# Patient Record
Sex: FEMALE | Race: WHITE | HISPANIC OR LATINO | Employment: UNEMPLOYED | URBAN - METROPOLITAN AREA
[De-identification: names, ages, dates, MRNs, and addresses within clinical notes are randomized per-mention and may not be internally consistent; named-entity substitution may affect disease eponyms.]

---

## 2019-08-12 ENCOUNTER — HOSPITAL ENCOUNTER (EMERGENCY)
Facility: OTHER | Age: 24
Discharge: HOME | End: 2019-08-12
Attending: EMERGENCY MEDICINE
Payer: MEDICAID

## 2019-08-12 VITALS
HEIGHT: 65 IN | BODY MASS INDEX: 22.49 KG/M2 | TEMPERATURE: 98 F | WEIGHT: 135 LBS | OXYGEN SATURATION: 100 % | DIASTOLIC BLOOD PRESSURE: 61 MMHG | HEART RATE: 84 BPM | SYSTOLIC BLOOD PRESSURE: 112 MMHG | RESPIRATION RATE: 16 BRPM

## 2019-08-12 DIAGNOSIS — O20.0 THREATENED MISCARRIAGE: Primary | ICD-10-CM

## 2019-08-12 DIAGNOSIS — O99.891 BACTERIURIA DURING PREGNANCY: ICD-10-CM

## 2019-08-12 DIAGNOSIS — R82.71 BACTERIURIA DURING PREGNANCY: ICD-10-CM

## 2019-08-12 LAB
ABO + RH BLD: NORMAL
ALBUMIN SERPL BCP-MCNC: 3.8 G/DL (ref 3.5–5.2)
ALP SERPL-CCNC: 68 U/L (ref 55–135)
ALT SERPL W/O P-5'-P-CCNC: 13 U/L (ref 10–44)
ANION GAP SERPL CALC-SCNC: 10 MMOL/L (ref 8–16)
APTT BLDCRRT: 28.7 SEC (ref 21–32)
AST SERPL-CCNC: 13 U/L (ref 10–40)
B-HCG UR QL: POSITIVE
BACTERIA #/AREA URNS HPF: ABNORMAL /HPF
BASOPHILS # BLD AUTO: 0.04 K/UL (ref 0–0.2)
BASOPHILS NFR BLD: 0.4 % (ref 0–1.9)
BILIRUB SERPL-MCNC: 0.2 MG/DL (ref 0.1–1)
BILIRUB UR QL STRIP: NEGATIVE
BUN SERPL-MCNC: 10 MG/DL (ref 6–20)
C TRACH DNA SPEC QL NAA+PROBE: NOT DETECTED
CALCIUM SERPL-MCNC: 9 MG/DL (ref 8.7–10.5)
CHLORIDE SERPL-SCNC: 105 MMOL/L (ref 95–110)
CLARITY UR: CLEAR
CO2 SERPL-SCNC: 24 MMOL/L (ref 23–29)
COLOR UR: YELLOW
CREAT SERPL-MCNC: 0.7 MG/DL (ref 0.5–1.4)
CTP QC/QA: YES
DIFFERENTIAL METHOD: ABNORMAL
EOSINOPHIL # BLD AUTO: 0.2 K/UL (ref 0–0.5)
EOSINOPHIL NFR BLD: 1.5 % (ref 0–8)
ERYTHROCYTE [DISTWIDTH] IN BLOOD BY AUTOMATED COUNT: 13.1 % (ref 11.5–14.5)
EST. GFR  (AFRICAN AMERICAN): >60 ML/MIN/1.73 M^2
EST. GFR  (NON AFRICAN AMERICAN): >60 ML/MIN/1.73 M^2
GLUCOSE SERPL-MCNC: 95 MG/DL (ref 70–110)
GLUCOSE UR QL STRIP: NEGATIVE
HCG INTACT+B SERPL-ACNC: NORMAL MIU/ML
HCT VFR BLD AUTO: 36.6 % (ref 37–48.5)
HGB BLD-MCNC: 11.9 G/DL (ref 12–16)
HGB UR QL STRIP: ABNORMAL
IMM GRANULOCYTES # BLD AUTO: 0.08 K/UL (ref 0–0.04)
IMM GRANULOCYTES NFR BLD AUTO: 0.7 % (ref 0–0.5)
INR PPP: 0.9 (ref 0.8–1.2)
KETONES UR QL STRIP: NEGATIVE
LEUKOCYTE ESTERASE UR QL STRIP: NEGATIVE
LYMPHOCYTES # BLD AUTO: 3.2 K/UL (ref 1–4.8)
LYMPHOCYTES NFR BLD: 28.3 % (ref 18–48)
MCH RBC QN AUTO: 28.7 PG (ref 27–31)
MCHC RBC AUTO-ENTMCNC: 32.5 G/DL (ref 32–36)
MCV RBC AUTO: 88 FL (ref 82–98)
MICROSCOPIC COMMENT: ABNORMAL
MONOCYTES # BLD AUTO: 0.8 K/UL (ref 0.3–1)
MONOCYTES NFR BLD: 7 % (ref 4–15)
N GONORRHOEA DNA SPEC QL NAA+PROBE: NOT DETECTED
NEUTROPHILS # BLD AUTO: 7 K/UL (ref 1.8–7.7)
NEUTROPHILS NFR BLD: 62.1 % (ref 38–73)
NITRITE UR QL STRIP: NEGATIVE
NRBC BLD-RTO: 0 /100 WBC
PH UR STRIP: 6 [PH] (ref 5–8)
PLATELET # BLD AUTO: 246 K/UL (ref 150–350)
PMV BLD AUTO: 10.5 FL (ref 9.2–12.9)
POTASSIUM SERPL-SCNC: 3.3 MMOL/L (ref 3.5–5.1)
PROT SERPL-MCNC: 6.9 G/DL (ref 6–8.4)
PROT UR QL STRIP: NEGATIVE
PROTHROMBIN TIME: 10.1 SEC (ref 9–12.5)
RBC # BLD AUTO: 4.14 M/UL (ref 4–5.4)
RBC #/AREA URNS HPF: 9 /HPF (ref 0–4)
SODIUM SERPL-SCNC: 139 MMOL/L (ref 136–145)
SP GR UR STRIP: <=1.005 (ref 1–1.03)
SQUAMOUS #/AREA URNS HPF: 11 /HPF
URN SPEC COLLECT METH UR: ABNORMAL
UROBILINOGEN UR STRIP-ACNC: NEGATIVE EU/DL
WBC # BLD AUTO: 11.32 K/UL (ref 3.9–12.7)
WBC #/AREA URNS HPF: 4 /HPF (ref 0–5)

## 2019-08-12 PROCEDURE — 87491 CHLMYD TRACH DNA AMP PROBE: CPT

## 2019-08-12 PROCEDURE — 85730 THROMBOPLASTIN TIME PARTIAL: CPT

## 2019-08-12 PROCEDURE — 85025 COMPLETE CBC W/AUTO DIFF WBC: CPT

## 2019-08-12 PROCEDURE — 81000 URINALYSIS NONAUTO W/SCOPE: CPT

## 2019-08-12 PROCEDURE — 84702 CHORIONIC GONADOTROPIN TEST: CPT

## 2019-08-12 PROCEDURE — 81025 URINE PREGNANCY TEST: CPT | Performed by: EMERGENCY MEDICINE

## 2019-08-12 PROCEDURE — 80053 COMPREHEN METABOLIC PANEL: CPT

## 2019-08-12 PROCEDURE — 99284 EMERGENCY DEPT VISIT MOD MDM: CPT | Mod: 25

## 2019-08-12 PROCEDURE — 86901 BLOOD TYPING SEROLOGIC RH(D): CPT

## 2019-08-12 PROCEDURE — 85610 PROTHROMBIN TIME: CPT

## 2019-08-12 RX ORDER — PYRIDOXINE HCL (VITAMIN B6) 25 MG
25 TABLET ORAL EVERY 6 HOURS PRN
Qty: 30 TABLET | Refills: 0 | Status: SHIPPED | OUTPATIENT
Start: 2019-08-12

## 2019-08-12 RX ORDER — ACETAMINOPHEN 325 MG/1
650 TABLET ORAL EVERY 6 HOURS PRN
Qty: 30 TABLET | Refills: 0 | Status: SHIPPED | OUTPATIENT
Start: 2019-08-12

## 2019-08-12 RX ORDER — NITROFURANTOIN 25; 75 MG/1; MG/1
100 CAPSULE ORAL 2 TIMES DAILY
Qty: 10 CAPSULE | Refills: 0 | Status: SHIPPED | OUTPATIENT
Start: 2019-08-12 | End: 2019-08-17

## 2019-08-12 NOTE — ED NOTES
Pt AAOx4, resting comfortably in bed, NAD, respirations E/UL, updated on POC, wheels locked and in low position, call bell with in reach, Comfort positioning and restroom needs were addressed. Necessary items were placed with in reach and was advised when a reassessment would take place. Will continue to monitor

## 2019-08-12 NOTE — ED NOTES
Pt AAOx4, resting comfortably in bed with spouse at bedside, NAD, respirations E/UL, updated on POC, wheels locked and in low position, call bell with in reach, Comfort positioning and restroom needs were addressed. Necessary items were placed with in reach and was advised when a reassessment would take place. Will continue to monitor

## 2019-08-12 NOTE — ED PROVIDER NOTES
Encounter Date: 8/12/2019    SCRIBE #1 NOTE: I, Emma Gary, am scribing for, and in the presence of, Dr. Stevens.       History     Chief Complaint   Patient presents with    Vaginal Bleeding     reports 7 weeks gestation with vaginal bleeding that began aprox 30 min PTA.      Time seen by provider: 1:17 AM    Patient presents with complaint of vaginal bleeding that began twenty minutes PTA. The patient reports she is 7 weeks pregnant and this is her first pregnancy. She reports the vaginal bleeding is heavier than her normal menstrual periods. She also reports minor abdominal pain, and minor associated dyspnea. She reports nausea which has been normal since the beginning of her pregnancy. The patient reports she has seen a OB/GYN for her pregnancy but has not yet had an ultrasound. The patient denies dysuria, burning sensations when urinating, chest pain, lightheadedness, or vomiting. She denies major medical problems.    The history is provided by the patient and medical records.     Review of patient's allergies indicates:  No Known Allergies  History reviewed. No pertinent past medical history.  History reviewed. No pertinent surgical history.  Family History   Problem Relation Age of Onset    Breast cancer Paternal Grandmother     Breast cancer Paternal Aunt     Breast cancer Paternal Aunt     Ovarian cancer Neg Hx     Colon cancer Neg Hx      Social History     Tobacco Use    Smoking status: Never Smoker    Smokeless tobacco: Never Used   Substance Use Topics    Alcohol use: Never     Frequency: Never    Drug use: Never     Review of Systems   All other systems reviewed and are negative.    ROS: As per HPI and below:   General: No fever.   HENT: No facial pain.   Eyes: Negative for eye pain.   Cardiovascular: No chest pain.   Respiratory:  Minor shortness of breath. No dyspnea.   GI: Abdominal pain. Nausea. No vomiting. No diarrhea.   : Vaginal bleeding. No dysuria. No burning sensations when  "urinating.  Skin: No rashes.   Neuro:  No lightheadedness. No syncope.  No focal deficits.   Musculoskeletal: No extremity pain.  All other systems reviewed and are negative.    Physical Exam     Initial Vitals [08/12/19 0108]   BP Pulse Resp Temp SpO2   121/66 85 18 98.4 °F (36.9 °C) 100 %      MAP       --         Physical Exam    Nursing note and vitals reviewed.    Nursing note and vitals reviewed.  /61   Pulse 84   Temp 98.3 °F (36.8 °C) (Oral)   Resp 16   Ht 5' 5" (1.651 m)   Wt 61.2 kg (135 lb)   SpO2 100%   BMI 22.47 kg/m²   Constitutional: Appropriately anxious and emotionally upset. AAOx3. No distress.  Eyes: EOMI. No discharge. Anicteric.  HENT:   Mouth/Throat: Oropharynx is clear. Uvula midline. Mucus membranes moist.  Neck: Normal range of motion. Neck supple.  Cardiovascular: Normal rate. No murmur, no gallop and no friction rub heard.   Pulmonary/Chest: No respiratory distress. Effort normal. No wheezes, no rales, no rhonchi.   Abdominal: Bowel sounds normal. Soft. No distension and no mass. Mild left lower quadrant tenderness. There is no rebound, no guarding, no tenderness at McBurney's point.  Musculoskeletal: No CVA tenderness. Normal range of motion.   Neurological: GCS 15. Alert and oriented to person, place, and time. No gross cranial nerve, light touch or strength deficit. Coordination normal.   Skin: Skin is warm and dry.   Psychiatric: Behavior is normal. Judgment normal.  Genitourinary:   External exam with no labial tenderness, no external lesions. Speculum exam with blood and clot in vault. No discharge, no friability. Os closed.  No cervical motion tenderness, no adnexal tenderness, no adnexal masses or fullness.  Chaperoned by tech or RN.     ED Course   Procedures  Labs Reviewed   CBC W/ AUTO DIFFERENTIAL - Abnormal; Notable for the following components:       Result Value    Hemoglobin 11.9 (*)     Hematocrit 36.6 (*)     Immature Granulocytes 0.7 (*)     Immature Grans " (Abs) 0.08 (*)     All other components within normal limits   COMPREHENSIVE METABOLIC PANEL - Abnormal; Notable for the following components:    Potassium 3.3 (*)     All other components within normal limits   URINALYSIS, REFLEX TO URINE CULTURE - Abnormal; Notable for the following components:    Specific Gravity, UA <=1.005 (*)     Occult Blood UA 2+ (*)     All other components within normal limits    Narrative:     Preferred Collection Type->Urine, Clean Catch   URINALYSIS MICROSCOPIC - Abnormal; Notable for the following components:    RBC, UA 9 (*)     Bacteria Few (*)     All other components within normal limits    Narrative:     Preferred Collection Type->Urine, Clean Catch   POCT URINE PREGNANCY - Abnormal; Notable for the following components:    POC Preg Test, Ur Positive (*)     All other components within normal limits   C. TRACHOMATIS/N. GONORRHOEAE BY AMP DNA    Narrative:     Sources by Resulting Lab:->[facility]   HCG, QUANTITATIVE, PREGNANCY   APTT   PROTIME-INR   GROUP & RH          Imaging Results          US OB Less Than 14 Wks with Transvag(xpd (Final result)  Result time 08/12/19 03:57:33    Final result by Ilya Klein MD (08/12/19 03:57:33)                 Impression:      Single live intrauterine pregnancy with estimated gestational age 7 weeks 3 days.      Electronically signed by: Ilya Klein MD  Date:    08/12/2019  Time:    03:57             Narrative:    EXAMINATION:  US OB LESS THAN 14 WKS WITH TRANSVAGINAL (XPD)    CLINICAL HISTORY:  r/o ectopic pregnancy;    TECHNIQUE:  Transabdominal sonography of the pelvis was performed, followed by transvaginal sonography to better evaluate the uterus and ovaries.    COMPARISON:  None.    FINDINGS:  The uterus measures 6.7 x 4.7 x 6.0 cm. Uterine parenchyma is homogeneous.    In the uterine cavity, there is a gestational sac containing a fetal pole measuring 11.5 mm by crown rump length and corresponds to a 7 weeks 3 days gestation.  Fetal heart rate is 140 BPM. The yolk sac measures 0.2 cm.    The right ovary measures 2.1 x 1.4 x 1.7 cm. The left ovary measures 2.2 x 1.6 x 2.1 cm. Arterial and venous flow are preserved bilaterally.  A probable small corpus luteum cyst is identified within the left ovary.  There is no significant free fluid appreciated within the pelvis.                                 Medical Decision Making:   Clinical Tests:   Lab Tests: Ordered and Reviewed            Scribe Attestation:   Scribe #1: I performed the above scribed service and the documentation accurately describes the services I performed. I attest to the accuracy of the note.    Attending Attestation:           Physician Attestation for Scribe:  Physician Attestation Statement for Scribe #1: I, Dr. Stevens, reviewed documentation, as scribed by Emma Gary in my presence, and it is both accurate and complete.         Attending ED Notes:   I independently interpreted and reviewed the patient's ultrasound notable for 7 week, 3 day intrauterine pregnancy.  Patient denies any further bleeding, had no further abdominal pain in the emergency department.  I discussed with patient and/or guardian/caretaker that this evaluation in the ED does not suggest any emergent or life threatening medical condition requiring admission or immediate intervention beyond what was provided in the ED. Regardless, an unremarkable evaluation in the ED does not preclude the development or presence of a serious or life threatening condition. As such, patient was instructed to return immediately for any worsening or change in current symptoms.     I had a detailed discussion with patient  and/or guardian/caretaker regarding findings, plan, return precautions, importance of medication adherence, need to follow-up with a PCP and specialist. All questions answered.   Patient partner at the bedside for instructions.  Note was created using voice recognition software. Note may have occasional  typographical errors that may not have been identified and edited despite initial review prior to signing.            ED Course as of Aug 13 121   Mon Aug 12, 2019   0226 Patient is a 24-year-old  female at approximately 7 weeks pregnancy who presents with vaginal bleeding and right pelvic abdominal cramping.  Physical exam notable for small amount of blood and clot in vault, and no adnexal tenderness, no vaginal discharge, otherwise patient well-appearing.  Initial differential included ectopic pregnancy, threatened , incomplete , coagulopathy.  I independently reviewed and interpreted labs which are notable for beta HCG 138k, very mild hypokalemia, hematuria, no anemia.       [RC]      ED Course User Index  [RC] Shawn Stevens MD     Clinical Impression:     1. Threatened miscarriage    2. Bacteriuria during pregnancy                                   Shawn Stevens MD  19 1220

## 2019-08-12 NOTE — DISCHARGE INSTRUCTIONS
Call your primary care doctor to make the first available appointment.     Keep all your medical appointments.     Take your regular medication as prescribed. Contact your primary care provider before running out of medication, or for any problems obtaining them.    Do not drive or operate heavy machinery while taking opioid or muscle relaxing medications. Examples include norco, percocet, xanax, valium, flexeril.     Overuse or long term use of pain and sedating medication may lead to addiction, dependence, organ failure, family and work problems, legal problems, accidental overdose and death.    If you do not have health insurance, you probably qualify for heavily discounted rates:  Call [phone] ([facility] hotline) or go to [email or web address].gov    Your evaluation in the ED does not suggest any emergent or life threatening medical condition requiring admission or immediate intervention beyond that provided in the ED.   However, the signs of a serious problem sometimes take more time to appear.   RETURN TO THE ER if any of the following occur:    Weakness, dizziness, fainting, or loss of consciousness   Fever of 100.4ºF (38ºC) or higher  Any worse symptoms  Any new or concerning symptoms

## 2019-08-12 NOTE — ED TRIAGE NOTES
Pt presents to ED with c/o of bright red vaginal bleeding and suprapubic pain tender with palpation. Reports she is 7 weeks pregnant and just noticed the bleeding earlier tonight. States she is having some nausea but denies any vomiting.  Denies any dysuria, burning, or fever

## 2019-08-13 ENCOUNTER — APPOINTMENT (OUTPATIENT)
Dept: LAB | Facility: HOSPITAL | Age: 24
End: 2019-08-13
Attending: OBSTETRICS & GYNECOLOGY
Payer: MEDICAID

## 2019-08-13 ENCOUNTER — INITIAL PRENATAL (OUTPATIENT)
Dept: OBSTETRICS AND GYNECOLOGY | Facility: CLINIC | Age: 24
End: 2019-08-13
Payer: MEDICAID

## 2019-08-13 VITALS — BODY MASS INDEX: 23.04 KG/M2 | WEIGHT: 138.44 LBS | SYSTOLIC BLOOD PRESSURE: 96 MMHG | DIASTOLIC BLOOD PRESSURE: 70 MMHG

## 2019-08-13 DIAGNOSIS — Z34.01 ENCOUNTER FOR SUPERVISION OF NORMAL FIRST PREGNANCY IN FIRST TRIMESTER: Primary | ICD-10-CM

## 2019-08-13 PROBLEM — Z34.00 PREGNANCY, SUPERVISION, NORMAL, FIRST: Status: ACTIVE | Noted: 2019-08-13

## 2019-08-13 LAB
ABO + RH BLD: NORMAL
BLD GP AB SCN CELLS X3 SERPL QL: NORMAL

## 2019-08-13 PROCEDURE — 99212 OFFICE O/P EST SF 10 MIN: CPT | Mod: PBBFAC,TH,PO | Performed by: STUDENT IN AN ORGANIZED HEALTH CARE EDUCATION/TRAINING PROGRAM

## 2019-08-13 PROCEDURE — 99999 PR PBB SHADOW E&M-EST. PATIENT-LVL II: CPT | Mod: PBBFAC,,, | Performed by: STUDENT IN AN ORGANIZED HEALTH CARE EDUCATION/TRAINING PROGRAM

## 2019-08-13 PROCEDURE — 86787 VARICELLA-ZOSTER ANTIBODY: CPT

## 2019-08-13 PROCEDURE — 87340 HEPATITIS B SURFACE AG IA: CPT

## 2019-08-13 PROCEDURE — 86762 RUBELLA ANTIBODY: CPT

## 2019-08-13 PROCEDURE — 99202 OFFICE O/P NEW SF 15 MIN: CPT | Mod: TH,S$PBB,, | Performed by: STUDENT IN AN ORGANIZED HEALTH CARE EDUCATION/TRAINING PROGRAM

## 2019-08-13 PROCEDURE — 83020 HEMOGLOBIN ELECTROPHORESIS: CPT

## 2019-08-13 PROCEDURE — 99202 PR OFFICE/OUTPT VISIT, NEW, LEVL II, 15-29 MIN: ICD-10-PCS | Mod: TH,S$PBB,, | Performed by: STUDENT IN AN ORGANIZED HEALTH CARE EDUCATION/TRAINING PROGRAM

## 2019-08-13 PROCEDURE — 86703 HIV-1/HIV-2 1 RESULT ANTBDY: CPT

## 2019-08-13 PROCEDURE — 99999 PR PBB SHADOW E&M-EST. PATIENT-LVL II: ICD-10-PCS | Mod: PBBFAC,,, | Performed by: STUDENT IN AN ORGANIZED HEALTH CARE EDUCATION/TRAINING PROGRAM

## 2019-08-13 PROCEDURE — 86592 SYPHILIS TEST NON-TREP QUAL: CPT

## 2019-08-13 PROCEDURE — 86850 RBC ANTIBODY SCREEN: CPT

## 2019-08-13 NOTE — PROGRESS NOTES
Chief Complaint   Patient presents with    Amenorrhea    Metrorrhagia    Pelvic Pain       HPI:  24 y.o. female  presents to establish OB care.    New patient presenting after vaginal bleeding this weekend in ED.     No LMP recorded. Patient is pregnant.    - Nausea:  y  - Vomiting: n  - Cramping: n  - Bleeding:  spotting    - Denies family history of bleeding disorders, birth defects (dad with Congential heart defect, fixed with surgery), or mental disability  - Denies any complications with prior pregnancy    Contraception: None  Pap: No result found, done at [facility], requesting labs. If not obtained, will do a pap at next visit       History reviewed. No pertinent past medical history.  History reviewed. No pertinent surgical history.    Social History     Tobacco Use    Smoking status: Never Smoker    Smokeless tobacco: Never Used   Substance Use Topics    Alcohol use: Never     Frequency: Never     Family History   Problem Relation Age of Onset    Breast cancer Paternal Grandmother     Breast cancer Paternal Aunt     Breast cancer Paternal Aunt     Ovarian cancer Neg Hx     Colon cancer Neg Hx      OB History    Para Term  AB Living   1             SAB TAB Ectopic Multiple Live Births                  # Outcome Date GA Lbr Forest/2nd Weight Sex Delivery Anes PTL Lv   1 Current                MEDICATIONS: Reviewed with patient.  ALLERGIES: Patient has no known allergies.     ROS:  Review of Systems    PHYSICAL EXAM:    BP 96/70   Wt 62.8 kg (138 lb 7.2 oz)   BMI 23.04 kg/m²     Physical Exam         ASSESSMENT & PLAN:   Encounter for supervision of normal first pregnancy in first trimester        - UPT positive    - Dating u/s 19,E EGA = 7w3d, AMERICA 3/27/20    - Initial prenatal labs    - Aneuploidy screening: desired, ordered    - PNV    NEW PREGNANCY COUNSELING  Patient was counseled today on:  - Routine prenatal blood tests including HIV and anticipated course of  prenatal care  - Prenatal vitamins and folic acid  - Weight gain, nutrition, and exercise  - Seafood and mercury  - Properly heating deli and prepared meats and avoiding unrefrigerated deli  meats, cheeses, and milk products,   - Avoiding cat litter and raw meats due to risk of Toxoplasmosis precautions   - Accuracy of the LMP-based [place] and the value of an early TV-u/s  - Aneuploidy and neural tube screening -- cffDNA, sequential screening, and AFP screen at 15 weeks  - OTC medication in the first trimester  - Harmful effects of smoking, etOH, and recreational drugs  - [facility] u/s  at 18-20 weeks.  - Common complaints of pregnancy  - Seat belt use  - Childbirth classes and hospital facilities  - All questions were answered    - Pain and bleeding precautions given    - Return to clinic in 4 weeks    Total visit time was 30 minutes with greater than 50% of time dedicated to counseling.    Fiona Pride MD  OBGYN PGY-1

## 2019-08-14 LAB
HBV SURFACE AG SERPL QL IA: NEGATIVE
HIV 1+2 AB+HIV1 P24 AG SERPL QL IA: NEGATIVE
RPR SER QL: NORMAL
RUBV IGG SER-ACNC: 192 IU/ML
RUBV IGG SER-IMP: REACTIVE

## 2019-08-16 LAB
HGB A2 MFR BLD HPLC: 2.7 % (ref 2.2–3.2)
HGB FRACT BLD ELPH-IMP: NORMAL
HGB FRACT BLD ELPH-IMP: NORMAL

## 2019-08-17 LAB
VARICELLA INTERPRETATION: ABNORMAL
VARICELLA ZOSTER IGG: 1.05 ISR (ref 0–0.9)

## 2019-08-19 PROBLEM — O09.899 SUSCEPTIBLE TO VARICELLA (NON-IMMUNE), CURRENTLY PREGNANT: Status: ACTIVE | Noted: 2019-08-19

## 2019-08-19 PROBLEM — Z28.39 SUSCEPTIBLE TO VARICELLA (NON-IMMUNE), CURRENTLY PREGNANT: Status: ACTIVE | Noted: 2019-08-19

## 2019-08-23 ENCOUNTER — TELEPHONE (OUTPATIENT)
Dept: OBSTETRICS AND GYNECOLOGY | Facility: CLINIC | Age: 24
End: 2019-08-23

## 2019-08-23 NOTE — TELEPHONE ENCOUNTER
Call returned pt informed Dr. Headley is out today she will call on Monday with results. Pt voiced understandinh

## 2019-08-23 NOTE — TELEPHONE ENCOUNTER
----- Message from Armando Castro sent at 8/23/2019 11:16 AM CDT -----  Contact: pt  Name of Who is Calling: pt    What is the request in detail: calling in regards to the results and hormone levels. Pt states 2 test are missing from the [facility] portal. Please contact to further discuss and advise      Can the clinic reply by [facility]: no    What Number to Call Back if not in [facility]: [phone]

## 2019-08-23 NOTE — TELEPHONE ENCOUNTER
----- Message from Kamini Partida sent at 8/23/2019  2:23 PM CDT -----  Pt returning nurse phone call. Pt can be reached at [phone].

## 2019-08-26 ENCOUNTER — PATIENT MESSAGE (OUTPATIENT)
Dept: OBSTETRICS AND GYNECOLOGY | Facility: CLINIC | Age: 24
End: 2019-08-26

## 2019-08-26 ENCOUNTER — TELEPHONE (OUTPATIENT)
Dept: OBSTETRICS AND GYNECOLOGY | Facility: CLINIC | Age: 24
End: 2019-08-26

## 2019-08-26 NOTE — TELEPHONE ENCOUNTER
----- Message from Elfego Luther MA sent at 8/23/2019  3:05 PM CDT -----  Pt requesting test results blood work

## 2019-09-10 ENCOUNTER — ROUTINE PRENATAL (OUTPATIENT)
Dept: OBSTETRICS AND GYNECOLOGY | Facility: CLINIC | Age: 24
End: 2019-09-10
Payer: MEDICAID

## 2019-09-10 VITALS
DIASTOLIC BLOOD PRESSURE: 60 MMHG | SYSTOLIC BLOOD PRESSURE: 104 MMHG | BODY MASS INDEX: 22.75 KG/M2 | WEIGHT: 136.69 LBS

## 2019-09-10 DIAGNOSIS — Z34.01 ENCOUNTER FOR SUPERVISION OF NORMAL FIRST PREGNANCY IN FIRST TRIMESTER: Primary | ICD-10-CM

## 2019-09-10 DIAGNOSIS — N30.00 ACUTE CYSTITIS WITHOUT HEMATURIA: ICD-10-CM

## 2019-09-10 PROCEDURE — 99999 PR PBB SHADOW E&M-EST. PATIENT-LVL II: CPT | Mod: PBBFAC,,, | Performed by: STUDENT IN AN ORGANIZED HEALTH CARE EDUCATION/TRAINING PROGRAM

## 2019-09-10 PROCEDURE — 90686 IIV4 VACC NO PRSV 0.5 ML IM: CPT | Mod: PBBFAC,PO

## 2019-09-10 PROCEDURE — 99213 OFFICE O/P EST LOW 20 MIN: CPT | Mod: TH,S$PBB,, | Performed by: STUDENT IN AN ORGANIZED HEALTH CARE EDUCATION/TRAINING PROGRAM

## 2019-09-10 PROCEDURE — 99213 PR OFFICE/OUTPT VISIT, EST, LEVL III, 20-29 MIN: ICD-10-PCS | Mod: TH,S$PBB,, | Performed by: STUDENT IN AN ORGANIZED HEALTH CARE EDUCATION/TRAINING PROGRAM

## 2019-09-10 PROCEDURE — 99999 PR PBB SHADOW E&M-EST. PATIENT-LVL II: ICD-10-PCS | Mod: PBBFAC,,, | Performed by: STUDENT IN AN ORGANIZED HEALTH CARE EDUCATION/TRAINING PROGRAM

## 2019-09-10 PROCEDURE — 99212 OFFICE O/P EST SF 10 MIN: CPT | Mod: PBBFAC,TH,PO | Performed by: STUDENT IN AN ORGANIZED HEALTH CARE EDUCATION/TRAINING PROGRAM

## 2019-09-10 PROCEDURE — 87086 URINE CULTURE/COLONY COUNT: CPT

## 2019-09-10 RX ORDER — ONDANSETRON 4 MG/1
4 TABLET, FILM COATED ORAL DAILY PRN
Qty: 30 TABLET | Refills: 1 | Status: SHIPPED | OUTPATIENT
Start: 2019-09-10 | End: 2020-09-09

## 2019-09-10 RX ORDER — AMOXICILLIN AND CLAVULANATE POTASSIUM 500; 125 MG/1; MG/1
1 TABLET, FILM COATED ORAL 2 TIMES DAILY
Qty: 14 TABLET | Refills: 0 | Status: SHIPPED | OUTPATIENT
Start: 2019-09-10 | End: 2019-09-17

## 2019-09-10 NOTE — PROGRESS NOTES
Complaints today: She has been having UTI symptoms for the past few days. Dysuria, frequency, urgency. No fever, chills. She is also still struggling with nausea and vomiting. She was taking unisom but states that it made her too sleepy. She also complains of abdominal cramping. She is also having some spotting.    /60   Wt 62 kg (136 lb 11 oz)   BMI 22.75 kg/m²     24 y.o., at 11w4d by Estimated Date of Delivery: 3/27/20  Patient Active Problem List   Diagnosis    Pregnancy, supervision, normal, first    Susceptible to varicella (non-immune), currently pregnant     OB History    Para Term  AB Living   1             SAB TAB Ectopic Multiple Live Births                  # Outcome Date GA Lbr Forest/2nd Weight Sex Delivery Anes PTL Lv   1 Current                Dating reviewed    Allergies and problem list reviewed and updated    Medical and surgical history reviewed    Prenatal labs reviewed and updated    Physical Exam:  GEN: No distress.  CARDIO: Regular rate  PULM: Normal respiratory effort.  ABD: Soft, nontender  NEURO: Alert and oriented    Bedside ultrasound reveals active fetus with +FHT.      Assessment:  23 yo  at 11w4d presents for routine OB visit.    Plan:   1. Nausea/vomiting - Rx for zofran sent to pharmacy  2. UTI - urine culture sent. Augmentin sent to pharmacy.  3. NT ordered.  4. RTC in 4 weeks.    Kandis Bowling MD  OBGYN PGY-2

## 2019-09-12 LAB — BACTERIA UR CULT: NORMAL

## 2019-09-13 ENCOUNTER — PROCEDURE VISIT (OUTPATIENT)
Dept: MATERNAL FETAL MEDICINE | Facility: CLINIC | Age: 24
End: 2019-09-13
Payer: MEDICAID

## 2019-09-13 ENCOUNTER — LAB VISIT (OUTPATIENT)
Dept: LAB | Facility: OTHER | Age: 24
End: 2019-09-13
Payer: MEDICAID

## 2019-09-13 VITALS — HEIGHT: 65 IN | WEIGHT: 137.13 LBS | BODY MASS INDEX: 22.85 KG/M2

## 2019-09-13 DIAGNOSIS — Z36.82 ENCOUNTER FOR NUCHAL TRANSLUCENCY TESTING: ICD-10-CM

## 2019-09-13 DIAGNOSIS — Z34.01 ENCOUNTER FOR SUPERVISION OF NORMAL FIRST PREGNANCY IN FIRST TRIMESTER: ICD-10-CM

## 2019-09-13 DIAGNOSIS — Z36.89 ENCOUNTER FOR FETAL ANATOMIC SURVEY: Primary | ICD-10-CM

## 2019-09-13 PROCEDURE — 99499 UNLISTED E&M SERVICE: CPT | Mod: S$PBB,,, | Performed by: OBSTETRICS & GYNECOLOGY

## 2019-09-13 PROCEDURE — 76813 PR US, OB NUCHAL, TRANSABDOM/TRANSVAG, FIRST GESTATION: ICD-10-PCS | Mod: 26,S$PBB,, | Performed by: OBSTETRICS & GYNECOLOGY

## 2019-09-13 PROCEDURE — 99499 NO LOS: ICD-10-PCS | Mod: S$PBB,,, | Performed by: OBSTETRICS & GYNECOLOGY

## 2019-09-13 PROCEDURE — 76813 OB US NUCHAL MEAS 1 GEST: CPT | Mod: PBBFAC | Performed by: OBSTETRICS & GYNECOLOGY

## 2019-09-13 PROCEDURE — 81508 FTL CGEN ABNOR TWO PROTEINS: CPT

## 2019-09-13 PROCEDURE — 36415 COLL VENOUS BLD VENIPUNCTURE: CPT

## 2019-09-13 PROCEDURE — 76813 OB US NUCHAL MEAS 1 GEST: CPT | Mod: 26,S$PBB,, | Performed by: OBSTETRICS & GYNECOLOGY

## 2019-09-13 NOTE — PROGRESS NOTES
Obstetrical ultrasound completed today.  See report in imaging section of [facility].

## 2019-09-18 LAB
# FETUSES US: NORMAL
AGE AT DELIVERY: 27
B-HCG MOM SERPL: NORMAL
B-HCG SERPL-ACNC: 83.2 IU/ML
FET CRL US.MEAS: 57.2 MM
FET NASAL BONE LENGTH US.MEAS: NORMAL MM
FET NUCHAL FOLD MOM THICKNESS US.MEAS: NORMAL
FET NUCHAL FOLD THICKNESS US.MEAS: 1.2 MM
FET TS 21 RISK FROM MAT AGE: NORMAL
GA (DAYS): 2 D
GA (WEEKS): 12 WK
IDDM PATIENT QL: NORMAL
INTEGRATED SCN PATIENT-IMP NAR: NORMAL
INTEGRATED SCN PATIENT-IMP: NEGATIVE
PAPP-A MOM SERPL: NORMAL
PAPP-A SERPL-MCNC: 592.7 NG/ML
SMOKING STATUS FTND: NO
TS 18 RISK FETUS: NORMAL
TS 21 RISK FETUS: NORMAL
US DATE: NORMAL

## 2019-09-26 ENCOUNTER — PATIENT MESSAGE (OUTPATIENT)
Dept: OBSTETRICS AND GYNECOLOGY | Facility: CLINIC | Age: 24
End: 2019-09-26

## 2019-09-27 ENCOUNTER — NURSE TRIAGE (OUTPATIENT)
Dept: ADMINISTRATIVE | Facility: CLINIC | Age: 24
End: 2019-09-27

## 2019-09-27 NOTE — TELEPHONE ENCOUNTER
Reason for Disposition   SEVERE abdominal pain (e.g., excruciating)    Additional Information   Negative: Shock suspected (e.g., cold/pale/clammy skin, too weak to stand, low BP, rapid pulse)   Negative: Difficult to awaken or acting confused (e.g., disoriented, slurred speech)   Negative: Passed out (i.e., fainted, collapsed and was not responding)   Negative: Sounds like a life-threatening emergency to the triager   Negative: Vaginal bleeding and pregnant > 20 weeks   Negative: Not pregnant or pregnancy status unknown    Protocols used: PREGNANCY - VAGINAL BLEEDING LESS THAN 20 WEEKS EGA-A-[place]    Marisol and her , Darren, called to say she is 13 weeks pregnant, and has had 3 episodes of bleeding in the first trimester.  This latest one began on Monday with spotting, but Wednesday it began to increase, is no longer spotting but she is using 2-3 light pads daily to contain. She is also reporting constant pain to her left groin, also noted to her lower left abdomen at 8 - 9 of 10 pain. No fever, but she states she has been having frequent chills.  Per [facility] triage protocol, recommend ED now for evaluation.  Darren will bring her to the [facility] ED now.  Message to Dr Fabiola Headley, OB, and Silvana Pride MD, res.   Please contact Fabiola directly with any additional care advice.

## 2019-10-07 ENCOUNTER — PATIENT MESSAGE (OUTPATIENT)
Dept: MATERNAL FETAL MEDICINE | Facility: CLINIC | Age: 24
End: 2019-10-07

## 2022-04-18 ENCOUNTER — TELEPHONE (OUTPATIENT)
Dept: FAMILY MEDICINE | Facility: CLINIC | Age: 27
End: 2022-04-18
Payer: MEDICAID

## 2022-04-18 NOTE — TELEPHONE ENCOUNTER
----- Message from Kp Ahuja sent at 4/18/2022  3:43 PM CDT -----  Contact: pt.  .Type:  Needs Medical Advice    Who Called: pt  Would the patient rather a call back or a response via [facility]? Call back  Best Call Back Number: [phone]  Additional Information: Pt. Would like to establish care with Dr. Rodriguez

## 2022-04-18 NOTE — TELEPHONE ENCOUNTER
Spoke with patient. Notified patient the doctors panels are not open at the moment for medicaid and directed her with [facility]. Patient understood.

## 2024-03-27 LAB
PAP RECOMMENDATION EXT: NORMAL
PAP SMEAR: NORMAL

## 2024-07-10 ENCOUNTER — HOSPITAL ENCOUNTER (EMERGENCY)
Facility: HOSPITAL | Age: 29
Discharge: HOME | End: 2024-07-10
Attending: EMERGENCY MEDICINE
Payer: MEDICAID

## 2024-07-10 VITALS
WEIGHT: 130 LBS | HEART RATE: 88 BPM | BODY MASS INDEX: 21.66 KG/M2 | HEIGHT: 65 IN | DIASTOLIC BLOOD PRESSURE: 74 MMHG | RESPIRATION RATE: 16 BRPM | SYSTOLIC BLOOD PRESSURE: 114 MMHG | TEMPERATURE: 99 F | OXYGEN SATURATION: 98 %

## 2024-07-10 DIAGNOSIS — R10.9 LEFT FLANK PAIN: Primary | ICD-10-CM

## 2024-07-10 LAB
ALBUMIN SERPL BCP-MCNC: 3.7 G/DL (ref 3.5–5.2)
ALP SERPL-CCNC: 63 U/L (ref 55–135)
ALT SERPL W/O P-5'-P-CCNC: 7 U/L (ref 10–44)
ANION GAP SERPL CALC-SCNC: 8 MMOL/L (ref 8–16)
AST SERPL-CCNC: 10 U/L (ref 10–40)
B-HCG UR QL: NEGATIVE
BASOPHILS # BLD AUTO: 0.03 K/UL (ref 0–0.2)
BASOPHILS NFR BLD: 0.3 % (ref 0–1.9)
BILIRUB SERPL-MCNC: 0.4 MG/DL (ref 0.1–1)
BUN SERPL-MCNC: 9 MG/DL (ref 6–20)
CALCIUM SERPL-MCNC: 9.1 MG/DL (ref 8.7–10.5)
CHLORIDE SERPL-SCNC: 105 MMOL/L (ref 95–110)
CO2 SERPL-SCNC: 25 MMOL/L (ref 23–29)
CREAT SERPL-MCNC: 0.8 MG/DL (ref 0.5–1.4)
CTP QC/QA: YES
DIFFERENTIAL METHOD BLD: ABNORMAL
EOSINOPHIL # BLD AUTO: 0.1 K/UL (ref 0–0.5)
EOSINOPHIL NFR BLD: 1 % (ref 0–8)
ERYTHROCYTE [DISTWIDTH] IN BLOOD BY AUTOMATED COUNT: 12.7 % (ref 11.5–14.5)
EST. GFR  (NO RACE VARIABLE): >60 ML/MIN/1.73 M^2
GLUCOSE SERPL-MCNC: 97 MG/DL (ref 70–110)
HCT VFR BLD AUTO: 32.9 % (ref 37–48.5)
HGB BLD-MCNC: 11 G/DL (ref 12–16)
IMM GRANULOCYTES # BLD AUTO: 0.05 K/UL (ref 0–0.04)
IMM GRANULOCYTES NFR BLD AUTO: 0.5 % (ref 0–0.5)
LYMPHOCYTES # BLD AUTO: 1.4 K/UL (ref 1–4.8)
LYMPHOCYTES NFR BLD: 13.8 % (ref 18–48)
MCH RBC QN AUTO: 29.3 PG (ref 27–31)
MCHC RBC AUTO-ENTMCNC: 33.4 G/DL (ref 32–36)
MCV RBC AUTO: 88 FL (ref 82–98)
MONOCYTES # BLD AUTO: 1.1 K/UL (ref 0.3–1)
MONOCYTES NFR BLD: 11.2 % (ref 4–15)
NEUTROPHILS # BLD AUTO: 7.4 K/UL (ref 1.8–7.7)
NEUTROPHILS NFR BLD: 73.2 % (ref 38–73)
NRBC BLD-RTO: 0 /100 WBC
PLATELET # BLD AUTO: 206 K/UL (ref 150–450)
PMV BLD AUTO: 10.7 FL (ref 9.2–12.9)
POTASSIUM SERPL-SCNC: 3.6 MMOL/L (ref 3.5–5.1)
PROT SERPL-MCNC: 7.1 G/DL (ref 6–8.4)
RBC # BLD AUTO: 3.75 M/UL (ref 4–5.4)
SODIUM SERPL-SCNC: 138 MMOL/L (ref 136–145)
WBC # BLD AUTO: 10.14 K/UL (ref 3.9–12.7)

## 2024-07-10 PROCEDURE — 81025 URINE PREGNANCY TEST: CPT

## 2024-07-10 PROCEDURE — 96374 THER/PROPH/DIAG INJ IV PUSH: CPT

## 2024-07-10 PROCEDURE — 85025 COMPLETE CBC W/AUTO DIFF WBC: CPT

## 2024-07-10 PROCEDURE — 25000003 PHARM REV CODE 250

## 2024-07-10 PROCEDURE — 96361 HYDRATE IV INFUSION ADD-ON: CPT

## 2024-07-10 PROCEDURE — 99285 EMERGENCY DEPT VISIT HI MDM: CPT | Mod: 25

## 2024-07-10 PROCEDURE — 80053 COMPREHEN METABOLIC PANEL: CPT

## 2024-07-10 PROCEDURE — 63600175 PHARM REV CODE 636 W HCPCS

## 2024-07-10 RX ORDER — ONDANSETRON 4 MG/1
4 TABLET, ORALLY DISINTEGRATING ORAL 2 TIMES DAILY
Qty: 12 TABLET | Refills: 0 | Status: SHIPPED | OUTPATIENT
Start: 2024-07-10

## 2024-07-10 RX ORDER — TAMSULOSIN HYDROCHLORIDE 0.4 MG/1
0.4 CAPSULE ORAL DAILY
Qty: 30 CAPSULE | Refills: 0 | Status: SHIPPED | OUTPATIENT
Start: 2024-07-10 | End: 2024-08-09

## 2024-07-10 RX ORDER — KETOROLAC TROMETHAMINE 30 MG/ML
15 INJECTION, SOLUTION INTRAMUSCULAR; INTRAVENOUS
Status: COMPLETED | OUTPATIENT
Start: 2024-07-10 | End: 2024-07-10

## 2024-07-10 RX ORDER — NAPROXEN 500 MG/1
500 TABLET ORAL 2 TIMES DAILY
Qty: 60 TABLET | Refills: 0 | Status: SHIPPED | OUTPATIENT
Start: 2024-07-10

## 2024-07-10 RX ADMIN — SODIUM CHLORIDE 1000 ML: 9 INJECTION, SOLUTION INTRAVENOUS at 06:07

## 2024-07-10 RX ADMIN — KETOROLAC TROMETHAMINE 15 MG: 30 INJECTION, SOLUTION INTRAMUSCULAR; INTRAVENOUS at 06:07

## 2024-07-10 NOTE — ED PROVIDER NOTES
"Encounter Date: 7/10/2024       History     Chief Complaint   Patient presents with    Flank Pain     Patient reports left sided flank pain since Monday with temp of 103, headache, and body aches. Patient [place]/t wait time but states that she was told she had "blood in my urine."     29-year-old female with past medical history of frequent kidney stones presents to the ED with left sided flank pain x 3 days. Patient states pain is described as intermittent that worsens with physical movement. Associated nausea, generalized body ache, and fever T 103 F at home. States current symptoms feels like her typical kidney stones. Denies urinary symptoms or hematuria. No vaginal discharge or odor. Of note, pt presented to [facility] ED and had UA and a pregnancy test. States she AMA due to the long waits. No treatments attempted prior to arrival. No chills, chest pain, shortness of breath, abdominal pain. No other acute complaints today.    The history is provided by the patient.     Review of patient's allergies indicates:  No Known Allergies  No past medical history on file.  No past surgical history on file.  Family History   Problem Relation Name Age of Onset    Breast cancer Paternal Grandmother      Breast cancer Paternal Aunt      Breast cancer Paternal Aunt      Ovarian cancer Neg Hx      Colon cancer Neg Hx       Social History     Tobacco Use    Smokeless tobacco: Never   Substance Use Topics    Alcohol use: Never    Drug use: Never     Review of Systems   Constitutional:  Negative for chills and fever.   Respiratory:  Negative for shortness of breath.    Cardiovascular:  Negative for chest pain.   Gastrointestinal:  Negative for abdominal distention, abdominal pain, nausea and vomiting.   Genitourinary:  Positive for flank pain. Negative for dysuria, frequency, hematuria and vaginal discharge.   Musculoskeletal:  Positive for back pain. Negative for neck pain and neck stiffness. "       Physical Exam     Initial Vitals [07/10/24 1810]   BP Pulse Resp Temp SpO2   110/68 105 18 98.5 °F (36.9 °C) 99 %      MAP       --         Physical Exam    Vitals reviewed.  Constitutional: She appears well-developed and well-nourished. She is not diaphoretic. No distress.   HENT:   Head: Normocephalic and atraumatic.   Right Ear: External ear normal.   Left Ear: External ear normal.   Mouth/Throat: Oropharynx is clear and moist.   Eyes: EOM are normal.   Neck: Neck supple.   Cardiovascular:  Normal rate and normal heart sounds.           Pulmonary/Chest: Breath sounds normal. No respiratory distress.   Abdominal: Abdomen is soft. Bowel sounds are normal. She exhibits no distension. There is no abdominal tenderness. There is no rebound and no guarding.   Musculoskeletal:         General: Tenderness present.      Cervical back: Neck supple.      Comments: Left sided CVA tenderness     Neurological: She is alert and oriented to person, place, and time. GCS score is 15. GCS eye subscore is 4. GCS verbal subscore is 5. GCS motor subscore is 6.   Skin: Capillary refill takes less than 2 seconds.   Psychiatric: She has a normal mood and affect. Her behavior is normal. Judgment and thought content normal.         ED Course   Procedures  Labs Reviewed   CBC W/ AUTO DIFFERENTIAL - Abnormal; Notable for the following components:       Result Value    RBC 3.75 (*)     Hemoglobin 11.0 (*)     Hematocrit 32.9 (*)     Immature Grans (Abs) 0.05 (*)     Mono # 1.1 (*)     Gran % 73.2 (*)     Lymph % 13.8 (*)     All other components within normal limits   COMPREHENSIVE METABOLIC PANEL - Abnormal; Notable for the following components:    ALT 7 (*)     All other components within normal limits   POCT URINE PREGNANCY          Imaging Results              CT Renal Stone Study ABD Pelvis WO (Final result)  Result time 07/10/24 18:56:08      Final result by Sathish Partida DO (07/10/24 18:56:08)                   Impression:       No acute abnormality of the abdomen or pelvis.      Electronically signed by: Sathish Partida  Date:    07/10/2024  Time:    18:56               Narrative:    EXAMINATION:  CT RENAL STONE STUDY ABD PELVIS WO    CLINICAL HISTORY:  Flank pain, kidney stone suspected;    TECHNIQUE:  Multiplanar images were obtained of the abdomen and pelvis from the hemidiaphragms through the symphysis pubis without intravenous contrast.    COMPARISON:  None    FINDINGS:  Lung Bases: Clear.    Heart: Heart size is normal.  No pericardial effusion.    Liver: The liver is enlarged.  There are no focal lesions.  There is no steatosis.    Biliary tract: No intrahepatic or extrahepatic biliary ductal dilatation.    Gallbladder: No radiodense gallstone. No wall thickening or pericholecystic fluid.    Pancreas: Normal. No pancreatic ductal dilatation.    Spleen: Normal size without focal lesion.    Adrenals: Normal.    Kidneys and urinary collecting systems: Normal.  No hydronephrosis or urolithiasis.    Lymph nodes: None enlarged.    Stomach and bowel: The stomach is normal.  Loops of small and large bowel are normal in caliber without evidence for inflammation or obstruction.    Peritoneum and mesentery: No ascites or free intraperitoneal air. No abdominal fluid collection.    Vasculature: Normal.    Urinary bladder: Normal.    Reproductive organs: The uterus and adnexae are unremarkable.    Body wall: No abnormality.    Musculoskeletal: No aggressive osseous lesion.                                       Medications   sodium chloride 0.9% bolus 1,000 mL 1,000 mL (0 mLs Intravenous Stopped 7/10/24 1953)   ketorolac injection 15 mg (15 mg Intravenous Given 7/10/24 1857)     Medical Decision Making  Differential Diagnosis includes, but is not limited to:  AAA, aortic dissection, SBO/volvulus, intussusception, ileus, appendicitis, cholecystitis, hepatitis, nephrolithiasis, pancreatitis, IBD/IBS, biliary colic, GERD, PUD, constipation,  UTI/pyelonephritis, musculoskeletal pain.       ED management     29-year-old female with past medical history of frequent kidney stones presents to the ED with left sided flank pain x 3 days. Patient is not toxic appearing, hemodynamically stable and resting comfortably on bed. Patient is well-appearing.  Awake and alert.  Afebrile with vitals WNL. No distress on exam. Patient presenting with flank pain.  Urinalysis does not appear to be infected.  CT scan without hydronephrosis or urolithiasis. Symptoms was possibly consistent with the passing of an acute kidney stone. Was given a dose of Toradol and started to notice some relief.  The patient has no increased creatinine, no renal insufficieny and no sign of infection. Pain is controlled while here in the emergency department. I will discharge home to follow up with urology if they have not passed the stone in one week. The patient is comfortable with this plan and comfortable going home at this time. D/t her frequent kidney stones,  will send home with Rx Flomax, Zofran and Naproxen as needed. After taking into careful account the historical factors and physical exam findings of the patient's presentation today, in conjunction with the empirical and objective data obtained on ED workup, no acute emergent medical condition has been identified. The patient appears to be low risk for an emergent medical condition and I feel it is safe and appropriate at this time for the patient to be discharged to follow-up as detailed in their discharge instructions for reevaluation and possible continued outpatient workup and management.          I have discussed the specifics of the workup with the patient and the patient has verbalized understanding of the details of the workup, the diagnosis, the treatment plan, and the need for outpatient follow-up with PCP. ED precautions given. Discussed with pt about returning to the ED, if symptoms fail to improve or worsen.     RESULTS:   Documented in ED course.   Labs/ekg interpreted by myself       Voice recognition software utilized in this note. Typographical and content errors may occur with this process. While efforts are made to detect and correct such errors, in some cases errors will persist. For this reason, wording in this document should be considered in the proper context and not strictly verbatim.                      Amount and/or Complexity of Data Reviewed  Labs: ordered. Decision-making details documented in ED Course.  Radiology: ordered. Decision-making details documented in ED Course.    Risk  Prescription drug management.               ED Course as of 07/11/24 1310   Wed Jul 10, 2024   1919 hCG Qualitative, Urine: Negative  Negative  [NW]   1919 Comprehensive metabolic panel(!)  Grossly unremarkable [NW]   1919 CBC auto differential(!)  H&H stable.  Platelet count within normal limits.  No signs of leukocytosis. [NW]   1919 CT Renal Stone Study ABD Pelvis WO  Agree with radiologist's CT interpretation  Impression:     No acute abnormality of the abdomen or pelvis.   [NW]   1946 hCG Qualitative, Urine: Negative [NW]   1952 BP: 110/68 [NW]   1952 Temp: 98.5 °F (36.9 °C) [NW]   1952 Pulse: 105 [NW]   1952 Resp: 18 [NW]   1953 SpO2: 99 % [NW]      ED Course User Index  [NW] Tamiko Suresh PA-C                           Clinical Impression:  Final diagnoses:  [R10.9] Left flank pain (Primary)          ED Disposition Condition    Discharge Stable          ED Prescriptions       Medication Sig Dispense Start Date End Date Auth. Provider    ondansetron (ZOFRAN-ODT) 4 MG TbDL Take 1 tablet (4 mg total) by mouth 2 (two) times daily. 12 tablet 7/10/2024 -- Tamiko Suresh PA-C    naproxen (NAPROSYN) 500 MG tablet Take 1 tablet (500 mg total) by mouth 2 (two) times daily. 60 tablet 7/10/2024 -- Tamiko Suresh PA-C    tamsulosin (FLOMAX) 0.4 mg Cap Take 1 capsule (0.4 mg total) by mouth once daily. 30 capsule 7/10/2024 8/9/2024 Kerwin  VINNY Morrison          Follow-up Information       Follow up With Specialties Details Why Contact Info Additional Information    your primary care provider  Schedule an appointment as soon as possible for a visit in 3 days As needed, If symptoms worsen      [facility] - Urology Urology Schedule an appointment as soon as possible for a visit in 3 days As needed, If symptoms worsen [address]  [address]  [facility] [id number] [phone] Please park in [address] or [address] and use [address]. Take [facility] elevators.             Tamiko Suresh PA-C  07/11/24 7278

## 2024-07-10 NOTE — DISCHARGE INSTRUCTIONS
Ms. Vega,    Thank you for letting me care for you today! It was nice meeting you, and I hope you feel better soon.   If you would like access to your chart and what was done today please utilize the [facility].   Please don't hesitate to return if your symptoms worsen or you develop any other worrisome symptoms.    Our goal in the emergency department is to always give you outstanding care and exceptional service. You may receive a survey by mail or e-mail in the next week regarding your experience in our ED. We would greatly appreciate you completing and returning the survey. Your feedback provides us with a way to recognize our staff who give very good care and it helps us learn how to improve when your experience was below our aspiration of excellence.     Sincerely,    Tamiko Suresh PA-C  Emergency Department Physician Assistant  [facility], [place], and [facility]

## 2024-07-11 NOTE — ED NOTES
Pt reports lower back pain starting Monday 7/8. Denies trauma. Denies pain or odor with urine, but states is darker than normal.

## 2024-10-30 ENCOUNTER — PATIENT OUTREACH (OUTPATIENT)
Dept: ADMINISTRATIVE | Facility: OTHER | Age: 29
End: 2024-10-30
Payer: MEDICAID

## 2024-11-15 ENCOUNTER — PATIENT OUTREACH (OUTPATIENT)
Dept: ADMINISTRATIVE | Facility: OTHER | Age: 29
End: 2024-11-15
Payer: MEDICAID

## 2024-11-15 NOTE — PROGRESS NOTES
CHW - Outreach Attempt    Community Health Worker left a voicemail message for 1st attempt to contact patient regarding: SDOH completion and assessment   Community Health Worker to attempt to contact patient on: 11/15/24

## 2024-11-18 ENCOUNTER — OFFICE VISIT (OUTPATIENT)
Dept: PRIMARY CARE CLINIC | Facility: CLINIC | Age: 29
End: 2024-11-18
Payer: MEDICAID

## 2024-11-18 VITALS
BODY MASS INDEX: 22.7 KG/M2 | WEIGHT: 136.25 LBS | OXYGEN SATURATION: 100 % | SYSTOLIC BLOOD PRESSURE: 110 MMHG | DIASTOLIC BLOOD PRESSURE: 75 MMHG | HEIGHT: 65 IN | HEART RATE: 83 BPM

## 2024-11-18 DIAGNOSIS — D64.9 ANEMIA, UNSPECIFIED TYPE: ICD-10-CM

## 2024-11-18 DIAGNOSIS — Z11.59 ENCOUNTER FOR HEPATITIS C SCREENING TEST FOR LOW RISK PATIENT: ICD-10-CM

## 2024-11-18 DIAGNOSIS — M32.9 HISTORY OF SYSTEMIC LUPUS ERYTHEMATOSUS (SLE): ICD-10-CM

## 2024-11-18 DIAGNOSIS — Z13.1 SCREENING FOR DIABETES MELLITUS: ICD-10-CM

## 2024-11-18 DIAGNOSIS — G89.29 LEFT FLANK PAIN, CHRONIC: ICD-10-CM

## 2024-11-18 DIAGNOSIS — Z00.00 ENCOUNTER FOR WELLNESS EXAMINATION IN ADULT: Primary | ICD-10-CM

## 2024-11-18 DIAGNOSIS — Z13.220 SCREENING CHOLESTEROL LEVEL: ICD-10-CM

## 2024-11-18 DIAGNOSIS — Z23 FLU VACCINE NEED: ICD-10-CM

## 2024-11-18 DIAGNOSIS — Z11.4 SCREENING FOR HIV (HUMAN IMMUNODEFICIENCY VIRUS): ICD-10-CM

## 2024-11-18 DIAGNOSIS — R10.9 LEFT FLANK PAIN, CHRONIC: ICD-10-CM

## 2024-11-18 DIAGNOSIS — Z87.448 H/O PYELONEPHRITIS: ICD-10-CM

## 2024-11-18 DIAGNOSIS — Z87.442 HISTORY OF KIDNEY STONES: ICD-10-CM

## 2024-11-18 DIAGNOSIS — Z13.29 SCREENING FOR THYROID DISORDER: ICD-10-CM

## 2024-11-18 DIAGNOSIS — Z01.419 WELL WOMAN EXAM WITH ROUTINE GYNECOLOGICAL EXAM: ICD-10-CM

## 2024-11-18 LAB
B-HCG UR QL: NEGATIVE
BILIRUB UR QL STRIP: NEGATIVE
CLARITY UR REFRACT.AUTO: CLEAR
COLOR UR AUTO: COLORLESS
CTP QC/QA: YES
GLUCOSE UR QL STRIP: NEGATIVE
HGB UR QL STRIP: ABNORMAL
KETONES UR QL STRIP: NEGATIVE
LEUKOCYTE ESTERASE UR QL STRIP: NEGATIVE
NITRITE UR QL STRIP: NEGATIVE
PH UR STRIP: 5 [PH] (ref 5–8)
PROT UR QL STRIP: NEGATIVE
SP GR UR STRIP: 1 (ref 1–1.03)
URN SPEC COLLECT METH UR: ABNORMAL

## 2024-11-18 PROCEDURE — 1159F MED LIST DOCD IN RCRD: CPT | Mod: CPTII,,, | Performed by: NURSE PRACTITIONER

## 2024-11-18 PROCEDURE — 99214 OFFICE O/P EST MOD 30 MIN: CPT | Mod: PBBFAC,PN | Performed by: NURSE PRACTITIONER

## 2024-11-18 PROCEDURE — 99999 PR PBB SHADOW E&M-EST. PATIENT-LVL IV: CPT | Mod: PBBFAC,,, | Performed by: NURSE PRACTITIONER

## 2024-11-18 PROCEDURE — 3078F DIAST BP <80 MM HG: CPT | Mod: CPTII,,, | Performed by: NURSE PRACTITIONER

## 2024-11-18 PROCEDURE — 90471 IMMUNIZATION ADMIN: CPT | Mod: PBBFAC,PN

## 2024-11-18 PROCEDURE — 99395 PREV VISIT EST AGE 18-39: CPT | Mod: S$PBB,,, | Performed by: NURSE PRACTITIONER

## 2024-11-18 PROCEDURE — 87086 URINE CULTURE/COLONY COUNT: CPT | Performed by: NURSE PRACTITIONER

## 2024-11-18 PROCEDURE — 90656 IIV3 VACC NO PRSV 0.5 ML IM: CPT | Mod: PBBFAC,PN

## 2024-11-18 PROCEDURE — 87186 SC STD MICRODIL/AGAR DIL: CPT | Performed by: NURSE PRACTITIONER

## 2024-11-18 PROCEDURE — 3074F SYST BP LT 130 MM HG: CPT | Mod: CPTII,,, | Performed by: NURSE PRACTITIONER

## 2024-11-18 PROCEDURE — 81025 URINE PREGNANCY TEST: CPT | Mod: PBBFAC,PN | Performed by: NURSE PRACTITIONER

## 2024-11-18 PROCEDURE — 99999PBSHW PR PBB SHADOW TECHNICAL ONLY FILED TO HB: Mod: PBBFAC,,,

## 2024-11-18 PROCEDURE — 3008F BODY MASS INDEX DOCD: CPT | Mod: CPTII,,, | Performed by: NURSE PRACTITIONER

## 2024-11-18 PROCEDURE — 99214 OFFICE O/P EST MOD 30 MIN: CPT | Mod: S$PBB,25,, | Performed by: NURSE PRACTITIONER

## 2024-11-18 PROCEDURE — 99999PBSHW POCT URINE PREGNANCY: Mod: PBBFAC,,,

## 2024-11-18 PROCEDURE — 81003 URINALYSIS AUTO W/O SCOPE: CPT | Performed by: NURSE PRACTITIONER

## 2024-11-18 PROCEDURE — 87088 URINE BACTERIA CULTURE: CPT | Performed by: NURSE PRACTITIONER

## 2024-11-18 RX ADMIN — INFLUENZA VIRUS VACCINE 0.5 ML: 15; 15; 15 SUSPENSION INTRAMUSCULAR at 09:11

## 2024-11-18 NOTE — PROGRESS NOTES
"Subjective:       Patient ID: Marisol Vega is a 29 y.o. female.    Chief Complaint: Establish Care    History of Present Illness   CHIEF COMPLAINT:  Patient presents with chronic left flank pain and recurrent kidney issues, including kidney stones and infections, seeking further evaluation and management.    HPI:  Patient reports chronic left flank pain persisting for years, with recurrent kidney-related episodes occurring approximately 3 times annually, each lasting about a month. These episodes involve exacerbated pain, fever, and general discomfort. She has a history of kidney stones and recurrent urinary tract infections.    In July, she visited the emergency department due to passing a kidney stone, which was followed by an infection. This pattern of developing infections post-stone passage is typical for her.    July lab work revealed low hemoglobin levels, indicating anemia. 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    In December 2022, the patient was hospitalized for nearly the entire month for suspected pyelonephritis. These kidney-related episodes significantly impact her quality of life.    Patient had a miscarriage in December of last year and has been attempting to conceive since then without success. She last saw a gynecologist in January 2024 for a post-miscarriage check-up.    During a trip to [facility] in 2022, the patient had another severe kidney-related episode requiring medical attention.    Previous medical encounters in the [facility] with positive JENNIFER, diagnose with lupus.    Patient denies current difficulty urinating, pain, or any discomfort.    MEDICATIONS:  Patient is currently taking a multivitamin.    MEDICAL HISTORY:  Patient has a history of kidney stones, anemia, and episodes of pyelonephritis. Her last menstrual period was on October 25th. She had her last Pap or pelvic exam in January, likely 2023. Patient " is not using any contraception and is sexually active. She has been pregnant twice (G2) with one live birth (P1) and one spontaneous  (SAB1) in 2022. Patient has a daughter who is almost 5 years old. She has received the COVID-19 vaccine. Patient also received a tetanus vaccine in , with the next dose due in .    TEST RESULTS:  In July, the patient's electrolytes, kidney function, and liver function tests were all within normal limits. Her CBC in July showed low levels, indicating anemia. A lupus test performed in the [facility] came back positive.    IMAGING:  An abdominal ultrasound was performed in July, which included scanning and checking of the kidneys. The results were normal.    SOCIAL HISTORY:  Patient consumes wine socially and does not drink soda. She is  to Darren.          History reviewed. No pertinent past medical history.     History reviewed. No pertinent surgical history.     Family History   Problem Relation Name Age of Onset    Breast cancer Paternal Grandmother      Breast cancer Paternal Aunt      Breast cancer Paternal Aunt      Ovarian cancer Neg Hx      Colon cancer Neg Hx         Social History     Tobacco Use   Smoking Status Never   Smokeless Tobacco Never       Social History     Social History Narrative    ** Merged History Encounter **            Review of patient's allergies indicates:  No Known Allergies     Review of Systems   Respiratory:  Negative for chest tightness and shortness of breath.    Cardiovascular:  Negative for chest pain and palpitations.   Gastrointestinal:  Negative for nausea and vomiting.   Genitourinary:  Positive for flank pain. Negative for decreased urine volume, dysuria, frequency, hematuria, menstrual problem, pelvic pain, urgency, vaginal bleeding, vaginal discharge and vaginal pain.   Skin: Negative.    Neurological:  Negative for dizziness, light-headedness and headaches.         Objective:      Vitals:    24 0825  "  BP: 110/75   Pulse: 83   SpO2: 100%   Weight: 61.8 kg (136 lb 3.9 oz)   Height: 5' 5" (1.651 m)      Physical Exam  Constitutional:       General: She is not in acute distress.     Appearance: She is well-developed.   HENT:      Head: Normocephalic and atraumatic.      Right Ear: External ear normal.      Left Ear: External ear normal.   Eyes:      General: No scleral icterus.     Extraocular Movements: Extraocular movements intact.      Conjunctiva/sclera: Conjunctivae normal.   Cardiovascular:      Rate and Rhythm: Normal rate and regular rhythm.      Heart sounds: Normal heart sounds. No murmur heard.     No friction rub. No gallop.   Pulmonary:      Effort: Pulmonary effort is normal.      Breath sounds: Normal breath sounds. No wheezing or rales.   Musculoskeletal:         General: Normal range of motion.      Cervical back: Normal range of motion and neck supple.   Lymphadenopathy:      Cervical: No cervical adenopathy.   Skin:     General: Skin is warm and dry.      Findings: No erythema or rash.   Neurological:      Mental Status: She is alert and oriented to person, place, and time.      Cranial Nerves: No cranial nerve deficit.   Psychiatric:         Mood and Affect: Mood normal.         Behavior: Behavior normal.         Assessment:       1. Encounter for wellness examination in adult    2. Anemia, unspecified type    3. Left flank pain, chronic    4. History of kidney stones    5. History of systemic lupus erythematosus (SLE)    6. H/O pyelonephritis    7. Well woman exam with routine gynecological exam    8. Flu vaccine need    9. Screening cholesterol level    10. Encounter for hepatitis C screening test for low risk patient    11. Screening for diabetes mellitus    12. Screening for HIV (human immunodeficiency virus)    13. Screening for thyroid disorder        Plan:       Encounter for wellness examination in adult  Counseled patient on importance of health prevention screening, immunizations, and " overall wellness.   Immunizations reviewed.    -     POCT urine pregnancy  -     Urinalysis  -     CULTURE, URINE    Anemia, unspecified type  -     FERRITIN; Future; Expected date: 11/18/2024  -     Iron and TIBC; Future; Expected date: 11/18/2024  -     CBC Auto Differential; Future; Expected date: 11/18/2024    Left flank pain, chronic  -     Ambulatory referral/consult to Nephrology; Future; Expected date: 11/18/2024    History of kidney stones  -     Ambulatory referral/consult to Nephrology; Future; Expected date: 11/18/2024    History of systemic lupus erythematosus (SLE)  -     JENNIFER; Future; Expected date: 11/18/2024    H/O pyelonephritis    Well woman exam with routine gynecological exam    Flu vaccine need  -     influenza (Flulaval, Fluzone, Fluarix) 45 mcg/0.5 mL IM vaccine (> or = 6 mo) 0.5 mL    Screening cholesterol level  -     Lipid Panel; Future; Expected date: 11/18/2024    Encounter for hepatitis C screening test for low risk patient  -     Hepatitis C Antibody; Future; Expected date: 11/18/2024    Screening for diabetes mellitus  -     Hemoglobin A1C; Future; Expected date: 11/18/2024    Screening for HIV (human immunodeficiency virus)  -     HIV 1/2 Ag/Ab (4th Gen); Future; Expected date: 11/18/2024    Screening for thyroid disorder  -     T4, Free; Future; Expected date: 11/18/2024  -     TSH; Future; Expected date: 11/18/2024    Assessment & Plan    KIDNEY STONES AND URINARY TRACT HEALTH:  Referred to Nephrology for evaluation of recurrent kidney stones, infections, and left flank pain.  Ordered urinalysis.  Recommend maintaining water intake.    ANEMIA AND GENERAL HEALTH SCREENING:  Ordered CBC with differential, comprehensive metabolic panel, iron studies, cholesterol panel, diabetes screening, HIV test, Hepatitis C test, thyroid function tests, and lupus panel.  Continued multivitamin.    PREGNANCY AND FERTILITY:  Referred to Gynecology for fertility concerns and follow-up after  miscarriage.    DIETARY RECOMMENDATIONS:  Patient to avoid sodas and limit sugar intake, including juices.    EYE HEALTH:  Referred to Ophthalmology for routine eye exam.    GENERAL MEDICAL CARE AND FOLLOW-UP:  Explained importance of consistent medical care and follow up in one healthcare system.  Discussed potential risks of frequent international travel for medical care, including differences in medical practices and potential inconsistencies in treatment.      Medication List with Changes/Refills   Current Medications    ACETAMINOPHEN (TYLENOL) 325 MG TABLET    Take 2 tablets (650 mg total) by mouth every 6 (six) hours as needed for Pain or Temperature greater than (100.3).    MULTIVITAMIN ORAL    Take by mouth.   Discontinued Medications    NAPROXEN (NAPROSYN) 500 MG TABLET    Take 1 tablet (500 mg total) by mouth 2 (two) times daily.    ONDANSETRON (ZOFRAN-ODT) 4 MG TBDL    Take 1 tablet (4 mg total) by mouth 2 (two) times daily.    PYRIDOXINE, VITAMIN B6, (B-6) 25 MG TAB    Take 1 tablet (25 mg total) by mouth every 6 (six) hours as needed (nausea).    TAMSULOSIN (FLOMAX) 0.4 MG CAP    Take 1 capsule (0.4 mg total) by mouth once daily.        Follow up in about 1 month (around 12/18/2024) for Dr. David oMnsivais.    I spent a total of 54 minutes on the day of the visit.This includes face to face time and non-face to face time preparing to see the patient (eg, review of tests), obtaining and/or reviewing separately obtained history, documenting clinical information in the electronic or other health record, independently interpreting results and communicating results to the patient/family/caregiver, or care coordinator.     ESTHER Dick, MSN, FNP-C     This note was generated with the assistance of ambient listening technology. Verbal consent was obtained by the patient and accompanying visitor(s) for the recording of patient appointment to facilitate this note. I attest to having reviewed and edited  the generated note for accuracy, though some syntax or spelling errors may persist. Please contact the author of this note for any clarification.

## 2024-11-18 NOTE — PROGRESS NOTES
After obtaining consent, and per orders of Jenny Ahmadi NP Influenza vaccine given by Carmen Diaz CMA. Patient instructed to remain in clinic for 15 minutes afterwards, and to report any adverse reaction to me immediately.

## 2024-11-20 ENCOUNTER — LAB VISIT (OUTPATIENT)
Dept: LAB | Facility: HOSPITAL | Age: 29
End: 2024-11-20
Attending: NURSE PRACTITIONER
Payer: MEDICAID

## 2024-11-20 DIAGNOSIS — Z13.220 SCREENING CHOLESTEROL LEVEL: ICD-10-CM

## 2024-11-20 DIAGNOSIS — Z13.1 SCREENING FOR DIABETES MELLITUS: ICD-10-CM

## 2024-11-20 DIAGNOSIS — M32.9 HISTORY OF SYSTEMIC LUPUS ERYTHEMATOSUS (SLE): ICD-10-CM

## 2024-11-20 DIAGNOSIS — Z13.29 SCREENING FOR THYROID DISORDER: ICD-10-CM

## 2024-11-20 DIAGNOSIS — Z11.4 SCREENING FOR HIV (HUMAN IMMUNODEFICIENCY VIRUS): ICD-10-CM

## 2024-11-20 DIAGNOSIS — A49.9 GRAM-NEGATIVE INFECTION: Primary | ICD-10-CM

## 2024-11-20 DIAGNOSIS — D64.9 ANEMIA, UNSPECIFIED TYPE: ICD-10-CM

## 2024-11-20 DIAGNOSIS — Z11.59 ENCOUNTER FOR HEPATITIS C SCREENING TEST FOR LOW RISK PATIENT: ICD-10-CM

## 2024-11-20 LAB
BASOPHILS # BLD AUTO: 0.04 K/UL (ref 0–0.2)
BASOPHILS NFR BLD: 0.6 % (ref 0–1.9)
CHOLEST SERPL-MCNC: 157 MG/DL (ref 120–199)
CHOLEST/HDLC SERPL: 2.7 {RATIO} (ref 2–5)
DIFFERENTIAL METHOD BLD: ABNORMAL
EOSINOPHIL # BLD AUTO: 0.4 K/UL (ref 0–0.5)
EOSINOPHIL NFR BLD: 5.4 % (ref 0–8)
ERYTHROCYTE [DISTWIDTH] IN BLOOD BY AUTOMATED COUNT: 12.9 % (ref 11.5–14.5)
ESTIMATED AVG GLUCOSE: 94 MG/DL (ref 68–131)
FERRITIN SERPL-MCNC: 17 NG/ML (ref 20–300)
HBA1C MFR BLD: 4.9 % (ref 4–5.6)
HCT VFR BLD AUTO: 35.8 % (ref 37–48.5)
HCV AB SERPL QL IA: NORMAL
HDLC SERPL-MCNC: 58 MG/DL (ref 40–75)
HDLC SERPL: 36.9 % (ref 20–50)
HGB BLD-MCNC: 12.2 G/DL (ref 12–16)
HIV 1+2 AB+HIV1 P24 AG SERPL QL IA: NORMAL
IMM GRANULOCYTES # BLD AUTO: 0.04 K/UL (ref 0–0.04)
IMM GRANULOCYTES NFR BLD AUTO: 0.6 % (ref 0–0.5)
IRON SERPL-MCNC: 59 UG/DL (ref 30–160)
LDLC SERPL CALC-MCNC: 83 MG/DL (ref 63–159)
LYMPHOCYTES # BLD AUTO: 2.1 K/UL (ref 1–4.8)
LYMPHOCYTES NFR BLD: 31.9 % (ref 18–48)
MCH RBC QN AUTO: 29.8 PG (ref 27–31)
MCHC RBC AUTO-ENTMCNC: 34.1 G/DL (ref 32–36)
MCV RBC AUTO: 88 FL (ref 82–98)
MONOCYTES # BLD AUTO: 0.5 K/UL (ref 0.3–1)
MONOCYTES NFR BLD: 7.9 % (ref 4–15)
NEUTROPHILS # BLD AUTO: 3.6 K/UL (ref 1.8–7.7)
NEUTROPHILS NFR BLD: 53.6 % (ref 38–73)
NONHDLC SERPL-MCNC: 99 MG/DL
NRBC BLD-RTO: 0 /100 WBC
PLATELET # BLD AUTO: 276 K/UL (ref 150–450)
PMV BLD AUTO: 11.2 FL (ref 9.2–12.9)
RBC # BLD AUTO: 4.09 M/UL (ref 4–5.4)
SATURATED IRON: 14 % (ref 20–50)
T4 FREE SERPL-MCNC: 0.93 NG/DL (ref 0.71–1.51)
TOTAL IRON BINDING CAPACITY: 411 UG/DL (ref 250–450)
TRANSFERRIN SERPL-MCNC: 278 MG/DL (ref 200–375)
TRIGL SERPL-MCNC: 80 MG/DL (ref 30–150)
TSH SERPL DL<=0.005 MIU/L-ACNC: 1.48 UIU/ML (ref 0.4–4)
WBC # BLD AUTO: 6.7 K/UL (ref 3.9–12.7)

## 2024-11-20 PROCEDURE — 86039 ANTINUCLEAR ANTIBODIES (ANA): CPT | Performed by: NURSE PRACTITIONER

## 2024-11-20 PROCEDURE — 83036 HEMOGLOBIN GLYCOSYLATED A1C: CPT | Performed by: NURSE PRACTITIONER

## 2024-11-20 PROCEDURE — 84443 ASSAY THYROID STIM HORMONE: CPT | Performed by: NURSE PRACTITIONER

## 2024-11-20 PROCEDURE — 86038 ANTINUCLEAR ANTIBODIES: CPT | Performed by: NURSE PRACTITIONER

## 2024-11-20 PROCEDURE — 86803 HEPATITIS C AB TEST: CPT | Performed by: NURSE PRACTITIONER

## 2024-11-20 PROCEDURE — 86235 NUCLEAR ANTIGEN ANTIBODY: CPT | Mod: 59 | Performed by: NURSE PRACTITIONER

## 2024-11-20 PROCEDURE — 82728 ASSAY OF FERRITIN: CPT | Performed by: NURSE PRACTITIONER

## 2024-11-20 PROCEDURE — 84439 ASSAY OF FREE THYROXINE: CPT | Performed by: NURSE PRACTITIONER

## 2024-11-20 PROCEDURE — 83540 ASSAY OF IRON: CPT | Performed by: NURSE PRACTITIONER

## 2024-11-20 PROCEDURE — 85025 COMPLETE CBC W/AUTO DIFF WBC: CPT | Performed by: NURSE PRACTITIONER

## 2024-11-20 PROCEDURE — 80061 LIPID PANEL: CPT | Performed by: NURSE PRACTITIONER

## 2024-11-20 PROCEDURE — 87389 HIV-1 AG W/HIV-1&-2 AB AG IA: CPT | Performed by: NURSE PRACTITIONER

## 2024-11-20 RX ORDER — CIPROFLOXACIN 250 MG/1
250 TABLET, FILM COATED ORAL 2 TIMES DAILY
Qty: 6 TABLET | Refills: 0 | Status: SHIPPED | OUTPATIENT
Start: 2024-11-20 | End: 2024-11-23

## 2024-11-21 ENCOUNTER — PATIENT OUTREACH (OUTPATIENT)
Dept: ADMINISTRATIVE | Facility: OTHER | Age: 29
End: 2024-11-21
Payer: MEDICAID

## 2024-11-21 LAB
ANA PATTERN 1: NORMAL
ANA SER QL IF: POSITIVE
ANA TITR SER IF: NORMAL {TITER}
BACTERIA UR CULT: ABNORMAL

## 2024-11-21 NOTE — PROGRESS NOTES
CHW - Outreach Attempt    Community Health Worker left a voicemail message for 3rd attempt to contact patient regarding: SDOH completion and assessment.  Community Health Worker to attempt to contact patient on: 11/21/24

## 2024-11-22 LAB
ANTI SM ANTIBODY: 0.12 RATIO (ref 0–0.99)
ANTI SM/RNP ANTIBODY: 0.14 RATIO (ref 0–0.99)
ANTI-SM INTERPRETATION: NEGATIVE
ANTI-SM/RNP INTERPRETATION: NEGATIVE
ANTI-SSA ANTIBODY: 0.09 RATIO (ref 0–0.99)
ANTI-SSA INTERPRETATION: NEGATIVE
ANTI-SSB ANTIBODY: 0.09 RATIO (ref 0–0.99)
ANTI-SSB INTERPRETATION: NEGATIVE
DSDNA AB SER-ACNC: NORMAL [IU]/ML

## 2024-11-26 ENCOUNTER — TELEPHONE (OUTPATIENT)
Dept: NEPHROLOGY | Facility: CLINIC | Age: 29
End: 2024-11-26
Payer: MEDICAID

## 2024-11-26 NOTE — TELEPHONE ENCOUNTER
Patient scheduled with Dr. Ruano for January     ----- Message from Nurse Perry sent at 11/18/2024 11:53 AM CST -----    ----- Message -----  From: Shaina Winston  Sent: 11/18/2024  10:47 AM CST  To: [facility] Nephrology Staff    Please reach out to patient to schedule appt

## 2024-11-27 DIAGNOSIS — A49.8 INFECTION DUE TO KLEBSIELLA AEROGENES: Primary | ICD-10-CM

## 2024-11-27 DIAGNOSIS — R76.8 ANA POSITIVE: Primary | ICD-10-CM

## 2024-11-27 RX ORDER — CIPROFLOXACIN 250 MG/1
250 TABLET, FILM COATED ORAL 2 TIMES DAILY
Qty: 6 TABLET | Refills: 0 | Status: SHIPPED | OUTPATIENT
Start: 2024-11-27 | End: 2024-11-30

## 2024-12-11 DIAGNOSIS — Z87.442 HISTORY OF KIDNEY STONES: Primary | ICD-10-CM

## 2024-12-18 ENCOUNTER — OFFICE VISIT (OUTPATIENT)
Dept: PRIMARY CARE CLINIC | Facility: CLINIC | Age: 29
End: 2024-12-18
Payer: MEDICAID

## 2024-12-18 VITALS
BODY MASS INDEX: 22.47 KG/M2 | WEIGHT: 135.06 LBS | HEART RATE: 83 BPM | DIASTOLIC BLOOD PRESSURE: 73 MMHG | TEMPERATURE: 98 F | OXYGEN SATURATION: 100 % | SYSTOLIC BLOOD PRESSURE: 104 MMHG

## 2024-12-18 DIAGNOSIS — Z87.440 HISTORY OF RECURRENT UTIS: ICD-10-CM

## 2024-12-18 DIAGNOSIS — Z31.69 INFERTILITY COUNSELING: ICD-10-CM

## 2024-12-18 DIAGNOSIS — D64.9 ANEMIA, UNSPECIFIED TYPE: ICD-10-CM

## 2024-12-18 DIAGNOSIS — R76.8 POSITIVE ANA (ANTINUCLEAR ANTIBODY): Primary | ICD-10-CM

## 2024-12-18 PROCEDURE — 1160F RVW MEDS BY RX/DR IN RCRD: CPT | Mod: CPTII,,,

## 2024-12-18 PROCEDURE — 3074F SYST BP LT 130 MM HG: CPT | Mod: CPTII,,,

## 2024-12-18 PROCEDURE — 99214 OFFICE O/P EST MOD 30 MIN: CPT | Mod: S$PBB,,,

## 2024-12-18 PROCEDURE — 99214 OFFICE O/P EST MOD 30 MIN: CPT | Mod: PBBFAC,PN

## 2024-12-18 PROCEDURE — 1159F MED LIST DOCD IN RCRD: CPT | Mod: CPTII,,,

## 2024-12-18 PROCEDURE — 3044F HG A1C LEVEL LT 7.0%: CPT | Mod: CPTII,,,

## 2024-12-18 PROCEDURE — G2211 COMPLEX E/M VISIT ADD ON: HCPCS | Mod: S$PBB,,,

## 2024-12-18 PROCEDURE — 99999 PR PBB SHADOW E&M-EST. PATIENT-LVL IV: CPT | Mod: PBBFAC,,,

## 2024-12-18 PROCEDURE — 3078F DIAST BP <80 MM HG: CPT | Mod: CPTII,,,

## 2024-12-18 PROCEDURE — 3008F BODY MASS INDEX DOCD: CPT | Mod: CPTII,,,

## 2024-12-18 NOTE — PROGRESS NOTES
Subjective:       Patient ID: Marisol Vega is a 29 y.o. female.    Chief Complaint: positive JENNIFER  HPI  Marisol Vega is a 29 y.o. year old female  who comes to clinic for positive JENNIFER    Patient recently had blood work done that had a positive JENNIFER.   Patient states she was told in [facility] she had lupus, JENNIFER done in [facility] was positive 1:320.  Last blood work showed JENNIFER 1:160, speckled with negative SM, SSA, SSB, DSDNA, SM/RNP.  UA does not show protein.  No malar rash, kidney and liver functions are normal    She also history history of anemia, she is taking prenatal vitamins and iron supplementation, last hemoglobin was wnl.    Patient and  went to conceive, history of miscarriage 12 months ago, he had been try to conceive for the past 4 months    States he has had recurrent UTIs and was told in [facility] she had double collecting system.  She was treated with ciprofloxacin recently for pansensitive Klebsiella    ROS negative except as above  Objective:      /73 (BP Location: Right arm, Patient Position: Sitting)   Pulse 83   Temp 98 °F (36.7 °C) (Oral)   Wt 61.3 kg (135 lb 0.5 oz)   LMP 12/18/2024 (Exact Date)   SpO2 100%   BMI 22.47 kg/m²    Physical Exam  Vitals reviewed.   Constitutional:       Appearance: Normal appearance.   HENT:      Head: Normocephalic.      Mouth/Throat:      Mouth: Mucous membranes are moist.   Cardiovascular:      Rate and Rhythm: Normal rate and regular rhythm.      Pulses: Normal pulses.      Heart sounds: Normal heart sounds.   Pulmonary:      Effort: Pulmonary effort is normal.      Breath sounds: Normal breath sounds. No wheezing or rhonchi.   Abdominal:      General: Abdomen is flat. There is no distension.   Musculoskeletal:         General: No swelling. Normal range of motion.      Cervical back: Normal range of motion.   Skin:     General: Skin is warm.      Coloration: Skin is not jaundiced.   Neurological:      Mental Status: She is alert.          Assessment:       1. Positive JENNIFER (antinuclear antibody)    2. History of recurrent UTIs    3. Anemia, unspecified type    4. Infertility counseling        Plan:       1. Positive JENNIFER (antinuclear antibody) (Primary)  Patient states she was told in [facility] she had lupus, JENNIFER done in [facility] was positive 1:320.  Last blood work showed JENNIFER 1:160, speckled with negative SM, SSA, SSB, DSDNA, SM/RNP.  UA does not show protein.  No malar rash, kidney and liver functions are normal  Endorses fatigue  Discussed with patient nonspecific positive JENNIFER results, wants rheumatology consultation  - Ambulatory referral/consult to Rheumatology; Future    2. History of recurrent UTIs  Was recently treated for pansensitive Klebsiella recently with Cipro.  Asymptomatic at this time  was told in [facility] she had double collecting system    3. Anemia, unspecified type  Condition stable  Reviewed last CBC, hemoglobin within normal limits.  Patient to continue taking prenatal vitamins    4. Infertility counseling  Have been trying to conceive for 3 months.  Counseled patient we should wait at least 3 to 6 more months.  If no conception we will send to fertility clinic.  Patient to continue taking prenatal vitamins          Follow up in about 3 months (around 3/18/2025).      David Monsivais M.D.    I spent a total of 30 minutes on the day of the visit.This includes face to face time and non-face to face time preparing to see the patient (eg, review of tests), obtaining and/or reviewing separately obtained history, documenting clinical information in the electronic or other health record, independently interpreting results and communicating results to the patient/family/caregiver, or care coordinator.

## 2025-02-04 ENCOUNTER — LAB VISIT (OUTPATIENT)
Dept: LAB | Facility: HOSPITAL | Age: 30
End: 2025-02-04
Payer: MEDICAID

## 2025-02-04 DIAGNOSIS — Z87.442 HISTORY OF KIDNEY STONES: ICD-10-CM

## 2025-02-04 LAB
ALBUMIN SERPL BCP-MCNC: 4.1 G/DL (ref 3.5–5.2)
ANION GAP SERPL CALC-SCNC: 12 MMOL/L (ref 8–16)
BASOPHILS # BLD AUTO: 0.04 K/UL (ref 0–0.2)
BASOPHILS NFR BLD: 0.6 % (ref 0–1.9)
BUN SERPL-MCNC: 10 MG/DL (ref 6–20)
CALCIUM SERPL-MCNC: 9.4 MG/DL (ref 8.7–10.5)
CHLORIDE SERPL-SCNC: 105 MMOL/L (ref 95–110)
CO2 SERPL-SCNC: 20 MMOL/L (ref 23–29)
CREAT SERPL-MCNC: 0.8 MG/DL (ref 0.5–1.4)
DIFFERENTIAL METHOD BLD: ABNORMAL
EOSINOPHIL # BLD AUTO: 0.4 K/UL (ref 0–0.5)
EOSINOPHIL NFR BLD: 5.8 % (ref 0–8)
ERYTHROCYTE [DISTWIDTH] IN BLOOD BY AUTOMATED COUNT: 12.8 % (ref 11.5–14.5)
EST. GFR  (NO RACE VARIABLE): >60 ML/MIN/1.73 M^2
FERRITIN SERPL-MCNC: 25 NG/ML (ref 20–300)
GLUCOSE SERPL-MCNC: 86 MG/DL (ref 70–110)
HCT VFR BLD AUTO: 38 % (ref 37–48.5)
HGB BLD-MCNC: 11.9 G/DL (ref 12–16)
IMM GRANULOCYTES # BLD AUTO: 0.05 K/UL (ref 0–0.04)
IMM GRANULOCYTES NFR BLD AUTO: 0.8 % (ref 0–0.5)
IRON SERPL-MCNC: 116 UG/DL (ref 30–160)
LYMPHOCYTES # BLD AUTO: 2.3 K/UL (ref 1–4.8)
LYMPHOCYTES NFR BLD: 35.6 % (ref 18–48)
MCH RBC QN AUTO: 29.5 PG (ref 27–31)
MCHC RBC AUTO-ENTMCNC: 31.3 G/DL (ref 32–36)
MCV RBC AUTO: 94 FL (ref 82–98)
MONOCYTES # BLD AUTO: 0.5 K/UL (ref 0.3–1)
MONOCYTES NFR BLD: 7.1 % (ref 4–15)
NEUTROPHILS # BLD AUTO: 3.3 K/UL (ref 1.8–7.7)
NEUTROPHILS NFR BLD: 50.1 % (ref 38–73)
NRBC BLD-RTO: 0 /100 WBC
PHOSPHATE SERPL-MCNC: 3.7 MG/DL (ref 2.7–4.5)
PLATELET # BLD AUTO: 281 K/UL (ref 150–450)
PMV BLD AUTO: 11.4 FL (ref 9.2–12.9)
POTASSIUM SERPL-SCNC: 4.3 MMOL/L (ref 3.5–5.1)
PTH-INTACT SERPL-MCNC: 32.8 PG/ML (ref 9–77)
RBC # BLD AUTO: 4.03 M/UL (ref 4–5.4)
SATURATED IRON: 28 % (ref 20–50)
SODIUM SERPL-SCNC: 137 MMOL/L (ref 136–145)
TOTAL IRON BINDING CAPACITY: 414 UG/DL (ref 250–450)
TRANSFERRIN SERPL-MCNC: 280 MG/DL (ref 200–375)
URATE SERPL-MCNC: 4.7 MG/DL (ref 2.4–5.7)
WBC # BLD AUTO: 6.52 K/UL (ref 3.9–12.7)

## 2025-02-04 PROCEDURE — 82728 ASSAY OF FERRITIN: CPT | Performed by: STUDENT IN AN ORGANIZED HEALTH CARE EDUCATION/TRAINING PROGRAM

## 2025-02-04 PROCEDURE — 80069 RENAL FUNCTION PANEL: CPT | Performed by: STUDENT IN AN ORGANIZED HEALTH CARE EDUCATION/TRAINING PROGRAM

## 2025-02-04 PROCEDURE — 85025 COMPLETE CBC W/AUTO DIFF WBC: CPT | Performed by: STUDENT IN AN ORGANIZED HEALTH CARE EDUCATION/TRAINING PROGRAM

## 2025-02-04 PROCEDURE — 83540 ASSAY OF IRON: CPT | Performed by: STUDENT IN AN ORGANIZED HEALTH CARE EDUCATION/TRAINING PROGRAM

## 2025-02-04 PROCEDURE — 84550 ASSAY OF BLOOD/URIC ACID: CPT | Performed by: STUDENT IN AN ORGANIZED HEALTH CARE EDUCATION/TRAINING PROGRAM

## 2025-02-04 PROCEDURE — 83970 ASSAY OF PARATHORMONE: CPT | Performed by: STUDENT IN AN ORGANIZED HEALTH CARE EDUCATION/TRAINING PROGRAM

## 2025-02-04 PROCEDURE — 36415 COLL VENOUS BLD VENIPUNCTURE: CPT | Performed by: STUDENT IN AN ORGANIZED HEALTH CARE EDUCATION/TRAINING PROGRAM

## 2025-02-10 NOTE — PROGRESS NOTES
Subjective     Chief Complaint: Nephrolithiasis    History of Present Illness:  Ms. Marisol Vega is a 29 y.o. female with active medical diagnosis of kidney stones, positive JENNIFER and duplex collecting system    Her care is fragmented between  and [facility]. She reports that in the last year she has had 5 urinary tract infections and one of them required admission for IV antibiotics. She was evaluated in [place] July 2024 for left sided flank pain, CT renal stone did not see any hydro or stones. Additionally she reports she was told she had a duplex collecting system. Aforementioned CT did not comment on duplex system specifically (was a noncontrasted study)    As far as her diet goes, she reports filling her quincy cup three times a day. She mostly cooks at home and does not add salt to her food. Aside from prenatal vitamins she is not taking any medications, prescribed or OTC. Reports good hygiene as well.    Her last UTI grew Kleb resistant against macrobid. She was prescribed cipro by the referring provider.     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     #Nephrolithiasis  States that she has had 3 episodes in the past. She has done a 24 hour urine study before but does not have access to them.         Review of Systems   Constitutional:  Negative for chills and fever.   HENT:  Negative for ear discharge and ear pain.    Eyes:  Negative for blurred vision and double vision.   Respiratory:  Negative for cough and shortness of  breath.    Cardiovascular:  Negative for chest pain and palpitations.   Gastrointestinal:  Negative for abdominal pain, constipation, diarrhea, nausea and vomiting.   Genitourinary:  Negative for dysuria and frequency.   Musculoskeletal:  Negative for myalgias and neck pain.   Skin:  Negative for itching and rash.   Neurological:  Negative for tingling and headaches.             PAST HISTORY:     No past medical history on file.    No past surgical history on file.    Family History   Problem Relation Name Age of Onset    Breast cancer Paternal Grandmother      Breast cancer Paternal Aunt      Breast cancer Paternal Aunt      Ovarian cancer Neg Hx      Colon cancer Neg Hx         Social History     Socioeconomic History    Marital status:    Tobacco Use    Smoking status: Never    Smokeless tobacco: Never   Substance and Sexual Activity    Alcohol use: Yes     Comment: occ    Drug use: Never    Sexual activity: Yes     Partners: Male     Birth control/protection: None   Social History Narrative    ** Merged History Encounter **            MEDICATIONS & ALLERGIES:     Current Outpatient Medications on File Prior to Visit   Medication Sig    acetaminophen (TYLENOL) 325 MG tablet Take 2 tablets (650 mg total) by mouth every 6 (six) hours as needed for Pain or Temperature greater than (100.3). (Patient not taking: Reported on 12/18/2024)    MULTIVITAMIN ORAL Take by mouth.     No current facility-administered medications on file prior to visit.       Review of patient's allergies indicates:  No Known Allergies    OBJECTIVE:     Vital Signs:  There were no vitals filed for this visit.    There is no height or weight on file to calculate BMI.     Physical Exam  Constitutional:       General: She is not in acute distress.     Appearance: Normal appearance. She is not ill-appearing or diaphoretic.   HENT:      Head: Normocephalic and atraumatic.      Mouth/Throat:      Pharynx: No oropharyngeal exudate or posterior  oropharyngeal erythema.   Eyes:      General: No scleral icterus.        Right eye: No discharge.         Left eye: No discharge.      Extraocular Movements: Extraocular movements intact.      Conjunctiva/sclera: Conjunctivae normal.      Pupils: Pupils are equal, round, and reactive to light.   Neck:      Vascular: No JVD.      Trachea: No tracheal deviation.   Cardiovascular:      Rate and Rhythm: Normal rate and regular rhythm.      Heart sounds: No murmur heard.  Pulmonary:      Effort: Pulmonary effort is normal. No respiratory distress.      Breath sounds: Normal breath sounds. No wheezing or rales.   Abdominal:      General: Abdomen is flat. Bowel sounds are normal. There is no distension.      Palpations: Abdomen is soft. There is no mass.      Tenderness: There is no abdominal tenderness.   Musculoskeletal:         General: No swelling, tenderness or deformity. Normal range of motion.      Cervical back: Normal range of motion and neck supple.   Lymphadenopathy:      Cervical: No cervical adenopathy.   Skin:     General: Skin is warm and dry.      Coloration: Skin is not jaundiced or pale.      Findings: No bruising, erythema or rash.   Neurological:      General: No focal deficit present.      Mental Status: She is alert and oriented to person, place, and time. Mental status is at baseline.      Cranial Nerves: No cranial nerve deficit.         Laboratory  Sodium   Date Value Ref Range Status   02/04/2025 137 136 - 145 mmol/L Final   07/10/2024 138 136 - 145 mmol/L Final   08/12/2019 139 136 - 145 mmol/L Final     Potassium   Date Value Ref Range Status   02/04/2025 4.3 3.5 - 5.1 mmol/L Final   07/10/2024 3.6 3.5 - 5.1 mmol/L Final   08/12/2019 3.3 (L) 3.5 - 5.1 mmol/L Final     Chloride   Date Value Ref Range Status   02/04/2025 105 95 - 110 mmol/L Final   07/10/2024 105 95 - 110 mmol/L Final   08/12/2019 105 95 - 110 mmol/L Final     CO2   Date Value Ref Range Status   02/04/2025 20 (L) 23 - 29 mmol/L  Final   07/10/2024 25 23 - 29 mmol/L Final   08/12/2019 24 23 - 29 mmol/L Final     BUN   Date Value Ref Range Status   02/04/2025 10 6 - 20 mg/dL Final   07/10/2024 9 6 - 20 mg/dL Final   08/12/2019 10 6 - 20 mg/dL Final     Creatinine   Date Value Ref Range Status   02/04/2025 0.8 0.5 - 1.4 mg/dL Final   07/10/2024 0.8 0.5 - 1.4 mg/dL Final   08/12/2019 0.7 0.5 - 1.4 mg/dL Final     eGFR   Date Value Ref Range Status   02/04/2025 >60.0 >60 mL/min/1.73 m^2 Final   07/10/2024 >60 >60 mL/min/1.73 m^2 Final     Calcium   Date Value Ref Range Status   02/04/2025 9.4 8.7 - 10.5 mg/dL Final   07/10/2024 9.1 8.7 - 10.5 mg/dL Final   08/12/2019 9.0 8.7 - 10.5 mg/dL Final     Phosphorus   Date Value Ref Range Status   02/04/2025 3.7 2.7 - 4.5 mg/dL Final     Albumin   Date Value Ref Range Status   02/04/2025 4.1 3.5 - 5.2 g/dL Final   07/10/2024 3.7 3.5 - 5.2 g/dL Final   08/12/2019 3.8 3.5 - 5.2 g/dL Final       Diagnostic Results:      There are no preventive care reminders to display for this patient.      ASSESSMENT & PLAN:   Ms. Marisol Vega is a 29 y.o. female     Nephrolithiasis  Discussed general stone prevention - plenty of water, low salt and low animal protein.   Obtain 24 stone risk profile, return in 3 months with RFP, UA and UPCR    Recurrent UTI  Already has referral to see urology - reports duplex collecting system and not verified by scans available to me.   Likely that her persistent trace hematuria is from infection/stones. If presumed cystoscopy is negative will work up for GN    History of kidney stones  -     Ambulatory referral/consult to Nephrology  -     Supersaturation Profile, Timed Urine; Future; Expected date: 02/12/2025  -     RENAL FUNCTION PANEL; Future; Expected date: 05/12/2025  -     Urinalysis; Future; Expected date: 05/12/2025    Left flank pain, chronic  -     Ambulatory referral/consult to Nephrology    History of recurrent UTI (urinary tract infection)        RTC in 3  months      Javid Ruano MD  Nephrology [facility] [place]

## 2025-02-12 ENCOUNTER — OFFICE VISIT (OUTPATIENT)
Dept: NEPHROLOGY | Facility: CLINIC | Age: 30
End: 2025-02-12
Payer: MEDICAID

## 2025-02-12 ENCOUNTER — TELEPHONE (OUTPATIENT)
Dept: UROLOGY | Facility: CLINIC | Age: 30
End: 2025-02-12
Payer: MEDICAID

## 2025-02-12 VITALS
DIASTOLIC BLOOD PRESSURE: 68 MMHG | WEIGHT: 139.25 LBS | RESPIRATION RATE: 18 BRPM | SYSTOLIC BLOOD PRESSURE: 114 MMHG | HEART RATE: 74 BPM | BODY MASS INDEX: 23.2 KG/M2 | HEIGHT: 65 IN | OXYGEN SATURATION: 98 %

## 2025-02-12 DIAGNOSIS — G89.29 LEFT FLANK PAIN, CHRONIC: ICD-10-CM

## 2025-02-12 DIAGNOSIS — Z87.442 HISTORY OF KIDNEY STONES: Primary | ICD-10-CM

## 2025-02-12 DIAGNOSIS — Z87.440 HISTORY OF RECURRENT UTI (URINARY TRACT INFECTION): ICD-10-CM

## 2025-02-12 DIAGNOSIS — R10.9 LEFT FLANK PAIN, CHRONIC: ICD-10-CM

## 2025-02-12 PROCEDURE — 3078F DIAST BP <80 MM HG: CPT | Mod: CPTII,,, | Performed by: STUDENT IN AN ORGANIZED HEALTH CARE EDUCATION/TRAINING PROGRAM

## 2025-02-12 PROCEDURE — 3074F SYST BP LT 130 MM HG: CPT | Mod: CPTII,,, | Performed by: STUDENT IN AN ORGANIZED HEALTH CARE EDUCATION/TRAINING PROGRAM

## 2025-02-12 PROCEDURE — 3008F BODY MASS INDEX DOCD: CPT | Mod: CPTII,,, | Performed by: STUDENT IN AN ORGANIZED HEALTH CARE EDUCATION/TRAINING PROGRAM

## 2025-02-12 PROCEDURE — 1159F MED LIST DOCD IN RCRD: CPT | Mod: CPTII,,, | Performed by: STUDENT IN AN ORGANIZED HEALTH CARE EDUCATION/TRAINING PROGRAM

## 2025-02-12 PROCEDURE — 99999 PR PBB SHADOW E&M-EST. PATIENT-LVL III: CPT | Mod: PBBFAC,,, | Performed by: STUDENT IN AN ORGANIZED HEALTH CARE EDUCATION/TRAINING PROGRAM

## 2025-02-12 PROCEDURE — 99213 OFFICE O/P EST LOW 20 MIN: CPT | Mod: PBBFAC | Performed by: STUDENT IN AN ORGANIZED HEALTH CARE EDUCATION/TRAINING PROGRAM

## 2025-02-12 PROCEDURE — 3066F NEPHROPATHY DOC TX: CPT | Mod: CPTII,,, | Performed by: STUDENT IN AN ORGANIZED HEALTH CARE EDUCATION/TRAINING PROGRAM

## 2025-02-12 PROCEDURE — 99214 OFFICE O/P EST MOD 30 MIN: CPT | Mod: S$PBB,,, | Performed by: STUDENT IN AN ORGANIZED HEALTH CARE EDUCATION/TRAINING PROGRAM

## 2025-02-17 ENCOUNTER — OFFICE VISIT (OUTPATIENT)
Dept: UROLOGY | Facility: CLINIC | Age: 30
End: 2025-02-17
Payer: MEDICAID

## 2025-02-17 DIAGNOSIS — N11.9 CHRONIC PYELONEPHRITIS: Primary | ICD-10-CM

## 2025-02-17 DIAGNOSIS — R31.29 MICROSCOPIC HEMATURIA: ICD-10-CM

## 2025-02-17 PROCEDURE — 99212 OFFICE O/P EST SF 10 MIN: CPT | Mod: PBBFAC | Performed by: UROLOGY

## 2025-02-17 PROCEDURE — 87086 URINE CULTURE/COLONY COUNT: CPT | Performed by: UROLOGY

## 2025-02-17 NOTE — PROGRESS NOTES
Subjective:       Patient ID: Marisol Vega is a 29 y.o. female who was referred by Self, Aaareferral    Chief Complaint:   Chief Complaint   Patient presents with    Nephrolithiasis     Kidney stones/ Kidney infections     Initial Visit       Recurrent Pyelonephritis  She had 5 episodes of pyelonephritis last year.  She had to be hospitalized twice last year.  She starting having issues with kidney infections a couple of years ago.  She also has issues with kidney stones.  She has never required a procedure for stones.  She also notes having issues with microscopic hematuria.  She tells me she is JENNIFER positive.  She recently saw Nephrology.  Currently she denies fever or flank pain.  She denies gross hematuria.    ACTIVE MEDICAL ISSUES:  Problem List[1]    PAST MEDICAL HISTORY  History reviewed. No pertinent past medical history.    PAST SURGICAL HISTORY:  History reviewed. No pertinent surgical history.    SOCIAL HISTORY:  Social History[2]    FAMILY HISTORY:  Family History   Problem Relation Name Age of Onset    Breast cancer Paternal Grandmother      Breast cancer Paternal Aunt      Breast cancer Paternal Aunt      Ovarian cancer Neg Hx      Colon cancer Neg Hx         ALLERGIES AND MEDICATIONS: updated and reviewed.  Review of patient's allergies indicates:  No Known Allergies  Current Outpatient Medications   Medication Sig    MULTIVITAMIN ORAL Take by mouth.    prenat.vits,shonna,min-iron-folic Tab Take by mouth.    acetaminophen (TYLENOL) 325 MG tablet Take 2 tablets (650 mg total) by mouth every 6 (six) hours as needed for Pain or Temperature greater than (100.3). (Patient not taking: Reported on 2/17/2025)     No current facility-administered medications for this visit.       Review of Systems   Constitutional:  Negative for chills, fatigue and fever.   Respiratory:  Negative for chest tightness and shortness of breath.    Cardiovascular:  Negative for chest pain.   Gastrointestinal:  Negative for  abdominal distention, constipation, nausea and vomiting.   Genitourinary:  Negative for difficulty urinating, dysuria, flank pain, frequency, hematuria and urgency.   Musculoskeletal:  Negative for arthralgias.   Neurological:  Negative for light-headedness.   Psychiatric/Behavioral:  Negative for confusion.        Objective:      There were no vitals filed for this visit.  Physical Exam  Vitals and nursing note reviewed.   Constitutional:       Appearance: She is well-developed.   HENT:      Head: Normocephalic.   Eyes:      Conjunctiva/sclera: Conjunctivae normal.   Neck:      Thyroid: No thyromegaly.      Trachea: No tracheal deviation.   Cardiovascular:      Rate and Rhythm: Normal rate.      Pulses: Normal pulses.      Heart sounds: Normal heart sounds.   Pulmonary:      Effort: Pulmonary effort is normal. No respiratory distress.      Breath sounds: Normal breath sounds. No wheezing.   Abdominal:      General: There is no distension.      Palpations: Abdomen is soft. There is no mass.      Tenderness: There is no abdominal tenderness. There is no guarding or rebound.      Hernia: No hernia is present.   Musculoskeletal:         General: No tenderness. Normal range of motion.      Cervical back: Normal range of motion.   Lymphadenopathy:      Cervical: No cervical adenopathy.   Skin:     General: Skin is warm and dry.      Findings: No erythema or rash.   Neurological:      Mental Status: She is alert and oriented to person, place, and time.   Psychiatric:         Behavior: Behavior normal.         Thought Content: Thought content normal.         Judgment: Judgment normal.         Urine dipstick shows positive for RBC's.  Micro exam: 10 RBC's per HPF.     CT Renal Stone Study ABD Pelvis WO  Order: [id number]   Status: Final result       Next appt: Today at 03:15 PM in Urology (Gaurang Brown MD)    Test Result Released: Yes (seen)    0 Result Notes  Details    Reading Physician Reading Date Result  Priority   Sathish Partida, DO  [phone]  7/10/2024 STAT     Narrative & Impression  EXAMINATION:  CT RENAL STONE STUDY ABD PELVIS WO     CLINICAL HISTORY:  Flank pain, kidney stone suspected;     TECHNIQUE:  Multiplanar images were obtained of the abdomen and pelvis from the hemidiaphragms through the symphysis pubis without intravenous contrast.     COMPARISON:  None     FINDINGS:  Lung Bases: Clear.     Heart: Heart size is normal.  No pericardial effusion.     Liver: The liver is enlarged.  There are no focal lesions.  There is no steatosis.     Biliary tract: No intrahepatic or extrahepatic biliary ductal dilatation.     Gallbladder: No radiodense gallstone. No wall thickening or pericholecystic fluid.     Pancreas: Normal. No pancreatic ductal dilatation.     Spleen: Normal size without focal lesion.     Adrenals: Normal.     Kidneys and urinary collecting systems: Normal.  No hydronephrosis or urolithiasis.     Lymph nodes: None enlarged.     Stomach and bowel: The stomach is normal.  Loops of small and large bowel are normal in caliber without evidence for inflammation or obstruction.     Peritoneum and mesentery: No ascites or free intraperitoneal air. No abdominal fluid collection.     Vasculature: Normal.     Urinary bladder: Normal.     Reproductive organs: The uterus and adnexae are unremarkable.     Body wall: No abnormality.     Musculoskeletal: No aggressive osseous lesion.     Impression:     No acute abnormality of the abdomen or pelvis.        Electronically signed by:Sathish Partida  Date:                                            07/10/2024  Time:                                           18:56        Exam Ended: 07/10/24 18:37 CDT       Assessment:       1. Chronic pyelonephritis    2. Microscopic hematuria          Plan:       1. Chronic pyelonephritis (Primary)    - CT Urogram Abd Pelvis W WO; Future  - Urine Culture High Risk  - POCT Urine Pregnancy    2. Microscopic hematuria  As  above  - Cystoscopy; Future             Follow up in about 4 weeks (around 3/17/2025) for Review X-ray, Cystoscopy.         [1]   Patient Active Problem List  Diagnosis    Pregnancy, supervision, normal, first    Susceptible to varicella (non-immune), currently pregnant   [2]   Social History  Tobacco Use    Smoking status: Never    Smokeless tobacco: Never   Substance Use Topics    Alcohol use: Yes     Comment: occ    Drug use: Never

## 2025-02-19 LAB — BACTERIA UR CULT: NORMAL

## 2025-02-20 ENCOUNTER — RESULTS FOLLOW-UP (OUTPATIENT)
Dept: UROLOGY | Facility: CLINIC | Age: 30
End: 2025-02-20

## 2025-02-20 ENCOUNTER — HOSPITAL ENCOUNTER (OUTPATIENT)
Dept: RADIOLOGY | Facility: HOSPITAL | Age: 30
Discharge: HOME | End: 2025-02-20
Attending: UROLOGY
Payer: MEDICAID

## 2025-02-20 DIAGNOSIS — N11.9 CHRONIC PYELONEPHRITIS: ICD-10-CM

## 2025-02-20 PROCEDURE — 74178 CT ABD&PLV WO CNTR FLWD CNTR: CPT | Mod: TC

## 2025-02-20 PROCEDURE — 25500020 PHARM REV CODE 255: Performed by: UROLOGY

## 2025-02-20 RX ADMIN — IOHEXOL 125 ML: 350 INJECTION, SOLUTION INTRAVENOUS at 03:02

## 2025-02-21 ENCOUNTER — PATIENT OUTREACH (OUTPATIENT)
Dept: ADMINISTRATIVE | Facility: HOSPITAL | Age: 30
End: 2025-02-21
Payer: MEDICAID

## 2025-03-06 ENCOUNTER — PROCEDURE VISIT (OUTPATIENT)
Dept: UROLOGY | Facility: CLINIC | Age: 30
End: 2025-03-06
Payer: MEDICAID

## 2025-03-06 VITALS — WEIGHT: 138.88 LBS | BODY MASS INDEX: 23.11 KG/M2

## 2025-03-06 DIAGNOSIS — R31.29 MICROSCOPIC HEMATURIA: ICD-10-CM

## 2025-03-06 PROCEDURE — 52000 CYSTOURETHROSCOPY: CPT | Mod: PBBFAC | Performed by: UROLOGY

## 2025-03-06 NOTE — PROCEDURES
Marisol Vega is a 29 y.o. female patient.    Weight: 63 kg (138 lb 14.2 oz) (03/06/25 1343)       Cystoscopy    Date/Time: 3/6/2025 2:04 PM    Performed by: Gaurang Brown MD  Authorized by: Gaurang Brown MD    Consent Done?:  Yes (Written)  Timeout: prior to procedure the correct patient, procedure, and site was verified    Local anesthesia used?: Yes    Anesthesia:  Lidocaine jelly  Anesthetic total (ml):  10  Indications: recurrent UTIs    Position:  Dorsal lithotomy  Anesthesia:  Lidocaine jelly  Scope type:  Flexible cystoscope   patient tolerated the procedure well with no immediate complications  Comments:      No fistulas  Left UO duplicated      3/6/2025

## 2025-04-03 ENCOUNTER — TELEPHONE (OUTPATIENT)
Dept: RHEUMATOLOGY | Facility: CLINIC | Age: 30
End: 2025-04-03
Payer: MEDICAID

## 2025-04-03 NOTE — TELEPHONE ENCOUNTER
----- Message from Blanquita sent at 4/3/2025 11:28 AM CDT -----  .Type:  Needs Medical AdviceWho Called: pt STUART DANGELO [[id number]]  taty Would the patient rather a call back or a response via [employer]? Call back Best Call Back Number: [phone] Information: pt  requesting call back currently scheduled for 04/04 needs to reschedule but no days are available

## 2025-04-04 ENCOUNTER — OFFICE VISIT (OUTPATIENT)
Dept: UROLOGY | Facility: CLINIC | Age: 30
End: 2025-04-04
Payer: MEDICAID

## 2025-04-04 VITALS — BODY MASS INDEX: 22.76 KG/M2 | WEIGHT: 136.81 LBS

## 2025-04-04 DIAGNOSIS — N11.9 CHRONIC PYELONEPHRITIS: Primary | ICD-10-CM

## 2025-04-04 PROCEDURE — 99999 PR PBB SHADOW E&M-EST. PATIENT-LVL II: CPT | Mod: PBBFAC,,, | Performed by: UROLOGY

## 2025-04-04 PROCEDURE — 99212 OFFICE O/P EST SF 10 MIN: CPT | Mod: PBBFAC | Performed by: UROLOGY

## 2025-04-04 RX ORDER — CEPHALEXIN 250 MG/1
250 CAPSULE ORAL DAILY
Qty: 30 CAPSULE | Refills: 11 | Status: SHIPPED | OUTPATIENT
Start: 2025-04-04

## 2025-04-04 NOTE — PROGRESS NOTES
Subjective:       Patient ID: Marisol Vega is a 30 y.o. female who was referred by No ref. provider found    Chief Complaint:   Chief Complaint   Patient presents with    Follow-up       Recurrent Pyelonephritis  She had 5 episodes of pyelonephritis last year.  She had to be hospitalized twice last year.  She starting having issues with kidney infections a couple of years ago.  She also has issues with kidney stones.  She has never required a procedure for stones.  She also notes having issues with microscopic hematuria.  She tells me she is JENNIFER positive.  She recently saw Nephrology.  Currently she denies fever or flank pain.  She denies gross hematuria.    04/04/2025  Cystoscopy last month showed no lesions or sign of fistula.  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      ACTIVE MEDICAL ISSUES:  Problem List[1]    PAST MEDICAL HISTORY  History reviewed. No pertinent past medical history.    PAST SURGICAL HISTORY:  History reviewed. No pertinent surgical history.    SOCIAL HISTORY:  Social History[2]    FAMILY HISTORY:  Family History   Problem Relation Name Age of Onset    Breast cancer Paternal Grandmother      Breast cancer Paternal Aunt      Breast cancer Paternal Aunt      Ovarian cancer Neg Hx      Colon cancer Neg Hx         ALLERGIES AND MEDICATIONS: updated and reviewed.  Review of patient's allergies indicates:  No Known Allergies  Current Outpatient Medications   Medication Sig    acetaminophen (TYLENOL) 325 MG tablet Take 2 tablets (650 mg total) by mouth every 6 (six) hours as needed for Pain or Temperature greater than (100.3). (Patient not taking: Reported on 2/17/2025)    cephALEXin (KEFLEX) 250 MG capsule Take 1 capsule (250 mg total) by mouth Daily.    MULTIVITAMIN ORAL Take by mouth.    prenat.vits,shonna,min-iron-folic Tab Take by mouth.     No current facility-administered medications for this visit.       Review of Systems   Constitutional:  Negative for chills, fatigue and  fever.   Respiratory:  Negative for chest tightness and shortness of breath.    Cardiovascular:  Negative for chest pain.   Gastrointestinal:  Negative for abdominal distention, constipation, nausea and vomiting.   Genitourinary:  Negative for difficulty urinating, dysuria, flank pain, frequency, hematuria and urgency.   Musculoskeletal:  Negative for arthralgias.   Neurological:  Negative for light-headedness.   Psychiatric/Behavioral:  Negative for confusion.        Objective:      Vitals:    04/04/25 1355   Weight: 62.1 kg (136 lb 12.7 oz)     Physical Exam  Vitals and nursing note reviewed.   Constitutional:       Appearance: She is well-developed.   HENT:      Head: Normocephalic.   Eyes:      Conjunctiva/sclera: Conjunctivae normal.   Neck:      Thyroid: No thyromegaly.      Trachea: No tracheal deviation.   Cardiovascular:      Rate and Rhythm: Normal rate.      Pulses: Normal pulses.      Heart sounds: Normal heart sounds.   Pulmonary:      Effort: Pulmonary effort is normal. No respiratory distress.      Breath sounds: Normal breath sounds. No wheezing.   Abdominal:      General: There is no distension.      Palpations: Abdomen is soft. There is no mass.      Tenderness: There is no abdominal tenderness. There is no guarding or rebound.      Hernia: No hernia is present.   Musculoskeletal:         General: No tenderness. Normal range of motion.      Cervical back: Normal range of motion.   Lymphadenopathy:      Cervical: No cervical adenopathy.   Skin:     General: Skin is warm and dry.      Findings: No erythema or rash.   Neurological:      Mental Status: She is alert and oriented to person, place, and time.   Psychiatric:         Behavior: Behavior normal.         Thought Content: Thought content normal.         Judgment: Judgment normal.         Urine dipstick shows positive for RBC's.  Micro exam: 10 RBC's per HPF.     CT Urogram Abd Pelvis W WO  Order: [id number]   Status: Final result       Next  appt: Today at 01:00 PM in Rheumatology (Keyla Hunter MD)       Dx: Chronic pyelonephritis    Test Result Released: Yes (seen)       Messages: Seen    1 Result Note       1 Patient Communication       View Follow-Up Encounter  Details    Reading Physician Reading Date Result Priority   Reyes Dennis MD  [phone]  2/20/2025 Routine     Narrative & Impression  EXAMINATION:  CT UROGRAM ABD PELVIS W WO     CLINICAL HISTORY:  Pyelonephritis, complicated; Chronic tubulo-interstitial nephritis, unspecified     TECHNIQUE:  Low dose axial, sagittal and coronal reformations were obtained from the lung bases to the pubic symphysis before and following the IV administration of 125 mL of Omnipaque 350.  Timing was optimized for nephrogram and excretory renal phases.     COMPARISON:  CT renal stone from 07/10/2024     FINDINGS:  Heart: No cardiomegaly or pericardial effusion.     Lung Bases: Symmetrically expanded and clear.     Liver: Normal in size and attenuation without focal hepatic abnormality.     Gallbladder: Normal appearance without evidence for cholecystitis.     Bile Ducts: No intra or extrahepatic biliary ductal dilation.     Pancreas: No pancreatic mass lesion or peripancreatic inflammatory change.     Spleen: Normal.     Adrenals: Normal.     Genitourinary: Normal in size and location.  No focal renal abnormality, nephrolithiasis, or hydroureteronephrosis.  Duplicated left ureter.  Bladder demonstrates smooth contours without bladder wall thickening.     Reproductive organs: 1.9 cm follicle within the left ovary.     GI tract/Mesentery: Stomach is normal appearance.  Visualized loops of small and large bowel are normal in caliber without evidence for inflammation or obstruction.     Peritoneal Space: No abdominopelvic ascites or intraperitoneal free air.     Retroperitoneum: No significant adenopathy.     Abdominal wall: Normal.     Vasculature: Abdominal aorta is normal in caliber without  significant calcific atherosclerosis.     Bones: Normal appearance without acute fracture or bony destructive process.     Impression:     No evidence of renal stones, renal mass, or bladder lesion.  Ureters are normal in course and caliber noting duplicated left ureter.        Electronically signed by:Reyes Dennis MD  Date:                                            02/20/2025  Time:                                           15:48        Exam Ended: 02/20/25 15:38 CST       Assessment:       1. Chronic pyelonephritis            Plan:       1. Chronic pyelonephritis (Primary)  We discussed her ureteral duplication.  We will try conservative measures with daily antibiotics.  If she wants definitive repair, I will send her to [facility] [place].    - cephALEXin (KEFLEX) 250 MG capsule; Take 1 capsule (250 mg total) by mouth Daily.  Dispense: 30 capsule; Refill: 11  - US Retroperitoneal Complete; Future             Follow up in about 6 months (around 10/4/2025) for Follow up Established, Review X-ray.           [1]   Patient Active Problem List  Diagnosis    Pregnancy, supervision, normal, first    Susceptible to varicella (non-immune), currently pregnant   [2]   Social History  Tobacco Use    Smoking status: Never    Smokeless tobacco: Never   Substance Use Topics    Alcohol use: Yes     Comment: occ    Drug use: Never

## 2025-04-25 ENCOUNTER — OFFICE VISIT (OUTPATIENT)
Dept: PRIMARY CARE CLINIC | Facility: CLINIC | Age: 30
End: 2025-04-25
Payer: MEDICAID

## 2025-04-25 VITALS
HEIGHT: 65 IN | OXYGEN SATURATION: 99 % | WEIGHT: 137.56 LBS | SYSTOLIC BLOOD PRESSURE: 110 MMHG | DIASTOLIC BLOOD PRESSURE: 78 MMHG | HEART RATE: 86 BPM | TEMPERATURE: 98 F | BODY MASS INDEX: 22.92 KG/M2

## 2025-04-25 DIAGNOSIS — R76.8 POSITIVE ANA (ANTINUCLEAR ANTIBODY): ICD-10-CM

## 2025-04-25 DIAGNOSIS — R53.83 FATIGUE, UNSPECIFIED TYPE: ICD-10-CM

## 2025-04-25 DIAGNOSIS — N63.10 BILATERAL BREAST LUMP: ICD-10-CM

## 2025-04-25 DIAGNOSIS — G47.00 INSOMNIA, UNSPECIFIED TYPE: Primary | ICD-10-CM

## 2025-04-25 DIAGNOSIS — N63.20 BILATERAL BREAST LUMP: ICD-10-CM

## 2025-04-25 PROCEDURE — 1160F RVW MEDS BY RX/DR IN RCRD: CPT | Mod: CPTII,,,

## 2025-04-25 PROCEDURE — 3066F NEPHROPATHY DOC TX: CPT | Mod: CPTII,,,

## 2025-04-25 PROCEDURE — 3074F SYST BP LT 130 MM HG: CPT | Mod: CPTII,,,

## 2025-04-25 PROCEDURE — 99999 PR PBB SHADOW E&M-EST. PATIENT-LVL V: CPT | Mod: PBBFAC,,,

## 2025-04-25 PROCEDURE — 99215 OFFICE O/P EST HI 40 MIN: CPT | Mod: PBBFAC,PN

## 2025-04-25 PROCEDURE — 99215 OFFICE O/P EST HI 40 MIN: CPT | Mod: S$PBB,,,

## 2025-04-25 PROCEDURE — 1159F MED LIST DOCD IN RCRD: CPT | Mod: CPTII,,,

## 2025-04-25 PROCEDURE — 3008F BODY MASS INDEX DOCD: CPT | Mod: CPTII,,,

## 2025-04-25 PROCEDURE — 3078F DIAST BP <80 MM HG: CPT | Mod: CPTII,,,

## 2025-04-25 PROCEDURE — G2211 COMPLEX E/M VISIT ADD ON: HCPCS | Mod: S$PBB,,,

## 2025-04-25 RX ORDER — HYDROXYZINE HYDROCHLORIDE 25 MG/1
25 TABLET, FILM COATED ORAL NIGHTLY PRN
Qty: 60 TABLET | Refills: 0 | Status: SHIPPED | OUTPATIENT
Start: 2025-04-25

## 2025-04-25 NOTE — PATIENT INSTRUCTIONS
Please call [facility]'s Medicaid Referral Team [phone] to follow up on your referrals.      Good Sleep Hygiene    The most common cause of insomnia is a change in your daily routine. For example, traveling, change in work hours, disruption of other behaviors (eating, exercise, leisure, etc.), and relationship conflicts can all cause sleep problems. Paying good attention to good sleep hygiene is the most important thing you can do to maintain good sleep.    DO:   1. Go to bed at the same time each day.     2. Get up from bed at the same time each day. Try to maintain something close to this on weekends.     3. Get regular exercise each day, preferably in the morning. There is good evidence that regular exercise improves restful sleep. This includes stretching and aerobic exercise.     4. Get regular exposure to outdoor or bright lights, especially in the late afternoon.     5. Keep the temperature in your bedroom comfortable.     6. Keep the bedroom quiet when sleeping.     7. Keep the bedroom dark enough to facilitate sleep.     8. Use your bed only for sleep (and sexual activity.) This will help you associate your bed with sleep, not with other activities like paying bills, talking on the phone, watching TV.    9. Establish a regular, relaxing bedtime routine. Relaxing rituals prior to bedtime may include a warm bath or shower, aromatherapy, reading, or listening to soothing music.     10. Use a relaxation exercise just before going to sleep or use relaxing imagery. Even if you don't fall asleep, this will allow your body to rest and feel relaxed.     11. Keep your feet and hands warm. Wear warm socks to bed.     12. Designate another time to write down problems & possible solutions in the late afternoon or early evening, not close to bedtime. Do not dwell on any one thought or idea- merely jot something down and put the idea aside.         DON'T:  1.Exercise just before going to bed. Try to keep it no closer  "than 3-4 hours before bed.     2. Engage in stimulating activity just before bed, such as playing a competitive game, watching an exciting program on television or movie, or having an important discussion with a loved one.     3. Have caffeine in the evening (coffee, many teas, chocolate, sodas, etc.)    4. Read or watch television in bed.     5. Use alcohol to help you sleep. It actually interrupts your sleep cycle.     6. Go to bed too hungry or too full.     7. Take another person's sleeping pills.     8. Take over the counter sleeping pills, without your doctor's knowledge. Tolerance can develop rapidly with these medications.     9. Take daytime naps. If you do, keep them to no more than 20 minutes, 8 hours before bedtime.     10. Command yourself to go to sleep. This only makes your mind and body more alert.     11. Watch the clock or count minutes; this usually causes more anxiety, which keeps you up.     12. Lie in bed awake for more than 20-30 minutes. Instead, get up, go to a different room (or different part of the bedroom), participate in a quiet activity (e.g. Non-excitable reading), and then return to bed when you feel sleepy. Do not turn on lights or sit in front of a bright TV or computer, this will stimulate your brain to wake up. Stay in a dark, quiet place. Do this as many times during the night as needed.     13. Succumb to maladaptive thoughts like: "Oh no, look how late it is, I'll never get to sleep" or "I must have eight hours of sleep each night, if I get less than eight hours of sleep I will get sick." Challenge your concerns and avoid catastrophizing. Remember that we cannot fully control our sleep process. Trying too hard to control it will make you more tense and more awake.     14. Change your daytime routine the next day if you didn't sleep well. Even if you have a bad night sleep and are tired it is important that you try to keep your daytime activities the same as you had planned. " That is, don't avoid activities or stay in bed late because you feel tired. This can reinforce the insomnia.     15. Increase caffeine intakes the next day, this can keep you up again the following night.

## 2025-04-26 NOTE — PROGRESS NOTES
"Subjective:       Patient ID: Marisol Vega is a 30 y.o. female.    Chief Complaint: insomnia    Marisol Vega is a 30 y.o. year old female  who comes to clinic for insomnia    HPI  Patient comes to clinic for insomnia. States this has been going on for approximately 4-5 months. This occurs daily.  Patient states she rarely naps. She usually goes to bed at 10:30 a.m. to 11:00 p.m., wakes up sometime in the middle of the night at around 2:00 a.m. and states it is hard for her to go back to sleep. States she sometimes stays awake up until 5:00 a.m.  She tried melatonin in the past with initial improvement but states that melatonin lost efficacy at some point. She exercises consistently, 5 times per week, occasionally snacks at night. Occasional etoh use, 3/month  Denies snoring, apnea, with no up gasping for air or choking.    Patient stated the due to her lack of sleep she is usually fatigue during the day.    Also complains of bilateral breast lumps, she had imaging done in [facility].  Still feels the breast lumps.  We do not have any records evaluating this condition.    ROS negative except as above  Objective:      /78 (BP Location: Right arm, Patient Position: Sitting)   Pulse 86   Temp 97.9 °F (36.6 °C) (Oral)   Ht 5' 5" (1.651 m)   Wt 62.4 kg (137 lb 9.1 oz)   LMP 04/03/2025 (Exact Date)   SpO2 99%   Breastfeeding No   BMI 22.89 kg/m²    Physical Exam  Vitals reviewed.   Constitutional:       Appearance: Normal appearance.   HENT:      Head: Normocephalic.      Mouth/Throat:      Mouth: Mucous membranes are moist.   Cardiovascular:      Rate and Rhythm: Normal rate and regular rhythm.      Pulses: Normal pulses.      Heart sounds: Normal heart sounds.   Pulmonary:      Effort: Pulmonary effort is normal.      Breath sounds: Normal breath sounds. No wheezing or rhonchi.   Abdominal:      General: Abdomen is flat. There is no distension.   Musculoskeletal:         General: No swelling. " Normal range of motion.      Cervical back: Normal range of motion.   Skin:     General: Skin is warm.      Coloration: Skin is not jaundiced.   Neurological:      Mental Status: She is alert.         Assessment:       1. Insomnia, unspecified type    2. Fatigue, unspecified type    3. Bilateral breast lump    4. Positive JENNIFER (antinuclear antibody)        Plan:       1. Insomnia, unspecified type (Primary)  Condition going on for 4-5 months, occurring almost on a daily basis.  We will do a trial of hydroxyzine to improve sleep. Melatonin was not effective.  - hydrOXYzine HCL (ATARAX) 25 MG tablet; Take 1 tablet (25 mg total) by mouth nightly as needed (insomnia).  Dispense: 60 tablet; Refill: 0    2. Fatigue, unspecified type  Patient endorses fatigue that has been going on for 2-3 months. This might be related to her lack of sleep, but we will get blood work to further evaluate this  - CBC Auto Differential; Future  - Ferritin; Future  - Iron and TIBC; Future  - TSH; Future  - T4; Future  - Vitamin D; Future  - Vitamin B12; Future  - Cortisol, 8AM; Future  - Transferrin; Future  - Comprehensive Metabolic Panel; Future    3. Bilateral breast lump  Endorses previous diagnosis of bilateral breast masses in [facility].   We will get imaging to further evaluate this  - Mammo Digital Diagnostic Bilat with Matt (XPD); Future  - US Breast Bilateral Limited; Future    4. Positive JENNIFER (antinuclear antibody)  Patient states she was told in [facility] she had lupus, JENNIFER done in [facility] was positive 1:320.  Last blood work showed JENNIFER 1:160, speckled with negative SM, SSA, SSB, DSDNA, SM/RNP.  UA does not show protein.  No malar rash, kidney and liver functions are normal  Endorses fatigue  Discussed with patient nonspecific positive JENNIFER results,  Had appointment with Rheumatology but missed it due to her being sick.  Resend referral  - Ambulatory referral/consult to Rheumatology; Future          Follow up in about 3 months  (around 7/25/2025).      David Monsivais M.D.    I spent a total of 40 minutes on the day of the visit.This includes face to face time and non-face to face time preparing to see the patient (eg, review of tests), obtaining and/or reviewing separately obtained history, documenting clinical information in the electronic or other health record, independently interpreting results and communicating results to the patient/family/caregiver, or care coordinator.

## 2025-05-13 ENCOUNTER — LAB VISIT (OUTPATIENT)
Dept: LAB | Facility: HOSPITAL | Age: 30
End: 2025-05-13
Attending: STUDENT IN AN ORGANIZED HEALTH CARE EDUCATION/TRAINING PROGRAM
Payer: MEDICAID

## 2025-05-13 DIAGNOSIS — R53.83 FATIGUE, UNSPECIFIED TYPE: ICD-10-CM

## 2025-05-13 DIAGNOSIS — Z87.442 HISTORY OF KIDNEY STONES: ICD-10-CM

## 2025-05-13 LAB
25(OH)D3+25(OH)D2 SERPL-MCNC: 29 NG/ML (ref 30–96)
ABSOLUTE EOSINOPHIL (OHS): 0.39 K/UL
ABSOLUTE MONOCYTE (OHS): 0.42 K/UL (ref 0.3–1)
ABSOLUTE NEUTROPHIL COUNT (OHS): 2.43 K/UL (ref 1.8–7.7)
ALBUMIN SERPL BCP-MCNC: 4.2 G/DL (ref 3.5–5.2)
ALP SERPL-CCNC: 54 UNIT/L (ref 40–150)
ALT SERPL W/O P-5'-P-CCNC: 12 UNIT/L (ref 10–44)
ANION GAP (OHS): 9 MMOL/L (ref 8–16)
AST SERPL-CCNC: 16 UNIT/L (ref 11–45)
BASOPHILS # BLD AUTO: 0.04 K/UL
BASOPHILS NFR BLD AUTO: 0.7 %
BILIRUB SERPL-MCNC: 0.7 MG/DL (ref 0.1–1)
BUN SERPL-MCNC: 11 MG/DL (ref 6–20)
CALCIUM SERPL-MCNC: 8.9 MG/DL (ref 8.7–10.5)
CHLORIDE SERPL-SCNC: 107 MMOL/L (ref 95–110)
CO2 SERPL-SCNC: 21 MMOL/L (ref 23–29)
CORTIS SERPL-MCNC: 20.8 UG/DL (ref 4.3–22.4)
CREAT SERPL-MCNC: 0.8 MG/DL (ref 0.5–1.4)
ERYTHROCYTE [DISTWIDTH] IN BLOOD BY AUTOMATED COUNT: 12.4 % (ref 11.5–14.5)
FERRITIN SERPL-MCNC: 38 NG/ML (ref 20–300)
GFR SERPLBLD CREATININE-BSD FMLA CKD-EPI: >60 ML/MIN/1.73/M2
GLUCOSE SERPL-MCNC: 86 MG/DL (ref 70–110)
HCT VFR BLD AUTO: 38.2 % (ref 37–48.5)
HGB BLD-MCNC: 12.1 GM/DL (ref 12–16)
IMM GRANULOCYTES # BLD AUTO: 0.04 K/UL (ref 0–0.04)
IMM GRANULOCYTES NFR BLD AUTO: 0.7 % (ref 0–0.5)
IRON SATN MFR SERPL: 40 % (ref 20–50)
IRON SERPL-MCNC: 161 UG/DL (ref 30–160)
LYMPHOCYTES # BLD AUTO: 2.41 K/UL (ref 1–4.8)
MCH RBC QN AUTO: 28.8 PG (ref 27–31)
MCHC RBC AUTO-ENTMCNC: 31.7 G/DL (ref 32–36)
MCV RBC AUTO: 91 FL (ref 82–98)
NUCLEATED RBC (/100WBC) (OHS): 0 /100 WBC
PLATELET # BLD AUTO: 245 K/UL (ref 150–450)
PMV BLD AUTO: 11.5 FL (ref 9.2–12.9)
POTASSIUM SERPL-SCNC: 4.2 MMOL/L (ref 3.5–5.1)
PROT SERPL-MCNC: 7.2 GM/DL (ref 6–8.4)
RBC # BLD AUTO: 4.2 M/UL (ref 4–5.4)
RELATIVE EOSINOPHIL (OHS): 6.8 %
RELATIVE LYMPHOCYTE (OHS): 42.1 % (ref 18–48)
RELATIVE MONOCYTE (OHS): 7.3 % (ref 4–15)
RELATIVE NEUTROPHIL (OHS): 42.4 % (ref 38–73)
SODIUM SERPL-SCNC: 137 MMOL/L (ref 136–145)
T4 SERPL-MCNC: 6.5 UG/DL (ref 4.5–11.5)
TIBC SERPL-MCNC: 404 UG/DL (ref 250–450)
TRANSFERRIN SERPL-MCNC: 273 MG/DL (ref 200–375)
TSH SERPL-ACNC: 1.81 UIU/ML (ref 0.4–4)
VIT B12 SERPL-MCNC: 473 PG/ML (ref 210–950)
WBC # BLD AUTO: 5.73 K/UL (ref 3.9–12.7)

## 2025-05-13 PROCEDURE — 84443 ASSAY THYROID STIM HORMONE: CPT

## 2025-05-13 PROCEDURE — 84436 ASSAY OF TOTAL THYROXINE: CPT

## 2025-05-13 PROCEDURE — 82306 VITAMIN D 25 HYDROXY: CPT

## 2025-05-13 PROCEDURE — 82728 ASSAY OF FERRITIN: CPT

## 2025-05-13 PROCEDURE — 83540 ASSAY OF IRON: CPT

## 2025-05-13 PROCEDURE — 82247 BILIRUBIN TOTAL: CPT

## 2025-05-13 PROCEDURE — 82533 TOTAL CORTISOL: CPT

## 2025-05-13 PROCEDURE — 85025 COMPLETE CBC W/AUTO DIFF WBC: CPT

## 2025-05-13 PROCEDURE — 36415 COLL VENOUS BLD VENIPUNCTURE: CPT

## 2025-05-13 PROCEDURE — 82607 VITAMIN B-12: CPT

## 2025-07-25 ENCOUNTER — OFFICE VISIT (OUTPATIENT)
Dept: PRIMARY CARE CLINIC | Facility: CLINIC | Age: 30
End: 2025-07-25
Payer: MEDICAID

## 2025-07-25 VITALS
WEIGHT: 128.81 LBS | HEIGHT: 65 IN | BODY MASS INDEX: 21.46 KG/M2 | SYSTOLIC BLOOD PRESSURE: 110 MMHG | OXYGEN SATURATION: 100 % | DIASTOLIC BLOOD PRESSURE: 75 MMHG | HEART RATE: 89 BPM

## 2025-07-25 DIAGNOSIS — N96 RECURRENT PREGNANCY LOSS: Primary | ICD-10-CM

## 2025-07-25 DIAGNOSIS — R30.0 DYSURIA: ICD-10-CM

## 2025-07-25 DIAGNOSIS — R76.8 POSITIVE ANA (ANTINUCLEAR ANTIBODY): ICD-10-CM

## 2025-07-25 PROCEDURE — 87086 URINE CULTURE/COLONY COUNT: CPT

## 2025-07-25 PROCEDURE — 3008F BODY MASS INDEX DOCD: CPT | Mod: CPTII,,,

## 2025-07-25 PROCEDURE — 1160F RVW MEDS BY RX/DR IN RCRD: CPT | Mod: CPTII,,,

## 2025-07-25 PROCEDURE — 99214 OFFICE O/P EST MOD 30 MIN: CPT | Mod: S$PBB,,,

## 2025-07-25 PROCEDURE — 3078F DIAST BP <80 MM HG: CPT | Mod: CPTII,,,

## 2025-07-25 PROCEDURE — 99999 PR PBB SHADOW E&M-EST. PATIENT-LVL V: CPT | Mod: PBBFAC,,,

## 2025-07-25 PROCEDURE — 3066F NEPHROPATHY DOC TX: CPT | Mod: CPTII,,,

## 2025-07-25 PROCEDURE — 99215 OFFICE O/P EST HI 40 MIN: CPT | Mod: PBBFAC,PN

## 2025-07-25 PROCEDURE — 1159F MED LIST DOCD IN RCRD: CPT | Mod: CPTII,,,

## 2025-07-25 PROCEDURE — G2211 COMPLEX E/M VISIT ADD ON: HCPCS | Mod: ,,,

## 2025-07-25 PROCEDURE — 3074F SYST BP LT 130 MM HG: CPT | Mod: CPTII,,,

## 2025-07-25 RX ORDER — NITROFURANTOIN 25; 75 MG/1; MG/1
100 CAPSULE ORAL 2 TIMES DAILY
Qty: 14 CAPSULE | Refills: 0 | Status: SHIPPED | OUTPATIENT
Start: 2025-07-25 | End: 2025-08-01

## 2025-07-25 NOTE — PROGRESS NOTES
"Subjective:       Patient ID: Marisol Vega is a 30 y.o. female.    Chief Complaint: Follow-up (Miscarriage 10 days ago pt is concerned of the reason /Pt states 2nd miscarriage ), possible uti  (Burning sensation during urination started on 07/17/2025), and Referral (Rheumatologist / [facility]gyn [facility] )    HPI  Patient comes to clinic for follow up as an blood work. Patient recently experienced pregnancy loss.  This is her 2nd pregnancy loss in the past 1-2 years.  Patient states she was told in [facility] she had lupus, JENNIFER done in [facility] was positive 1:320.  Last blood work showed JENNIFER 1:160, speckled with negative SM, SSA, SSB, DSDNA, SM/RNP.   She had appointment with Rheumatology but missed it due to having urology appointment at the same time.    Patient has been investigated for antiphospholipid syndrome by her gynecologist.    Patient is interested in referral to fertility clinic. Patient has been trying to conceive for the past 2-3 years.    Patient currently has dysuria and urgency. Patient states her spotting has decreased after her pregnancy loss.    ROS negative except as above  Objective:      /75 (BP Location: Left arm, Patient Position: Sitting)   Pulse 89   Ht 5' 5" (1.651 m)   Wt 58.4 kg (128 lb 12.8 oz)   SpO2 100%   BMI 21.43 kg/m²    Physical Exam  Vitals reviewed.   Constitutional:       Appearance: Normal appearance.   HENT:      Head: Normocephalic.      Mouth/Throat:      Mouth: Mucous membranes are moist.   Cardiovascular:      Rate and Rhythm: Normal rate and regular rhythm.      Pulses: Normal pulses.      Heart sounds: Normal heart sounds.   Pulmonary:      Effort: Pulmonary effort is normal.      Breath sounds: Normal breath sounds. No wheezing or rhonchi.   Abdominal:      General: Abdomen is flat. There is no distension.   Musculoskeletal:         General: No swelling. Normal range of motion.      Cervical back: Normal range of motion.   Skin:     General: Skin is warm. "      Coloration: Skin is not jaundiced.   Neurological:      Mental Status: She is alert.         Assessment:       1. Recurrent pregnancy loss    2. Positive JENNIFER (antinuclear antibody)    3. Dysuria        Plan:       1. Recurrent pregnancy loss (Primary)  This is her 2nd pregnancy loss in the past 1-2 years.  Patient states she was told in [facility] she had lupus, JENNIFER done in [facility] was positive 1:320.  Last blood work showed JENNIFER 1:160, speckled with negative SM, SSA, SSB, DSDNA, SM/RNP.   Patient wants referral to fertility clinic. Patient has an appointment with Dermatology in 5 months. She had a previous appointment with hepatology but had to miss I due to having Urology appointment at the same time.  - Ambulatory referral/consult to Gynecology; Future  - Ambulatory referral/consult to Infertility; Future  - Ambulatory referral/consult to Rheumatology; Future    2. Positive JENNIFER (antinuclear antibody)  This is her 2nd pregnancy loss in the past 1-2 years.  Patient states she was told in [facility] she had lupus, JENNIFER done in [facility] was positive 1:320.  Last blood work showed JENNIFER 1:160, speckled with negative SM, SSA, SSB, DSDNA, SM/RNP.   Patient wants referral to fertility clinic. Patient has an appointment with Dermatology in 5 months. She had a previous appointment with hepatology but had to miss I due to having Urology appointment at the same time.  - Ambulatory referral/consult to Gynecology; Future  - Ambulatory referral/consult to Infertility; Future  - Ambulatory referral/consult to Rheumatology; Future    3. Dysuria  Patient with dysuria symptoms.  We will send culture to the lab and prescribed a course of Macrobid  - POCT URINALYSIS  - nitrofurantoin, macrocrystal-monohydrate, (MACROBID) 100 MG capsule; Take 1 capsule (100 mg total) by mouth 2 (two) times daily. for 7 days  Dispense: 14 capsule; Refill: 0  - Urine Culture High Risk ($$)          Follow up in about 3 months (around  10/25/2025).      David Monsivais M.D.    I spent a total of 35 minutes on the day of the visit.This includes face to face time and non-face to face time preparing to see the patient (eg, review of tests), obtaining and/or reviewing separately obtained history, documenting clinical information in the electronic or other health record, independently interpreting results and communicating results to the patient/family/caregiver, or care coordinator.

## 2025-07-28 ENCOUNTER — PATIENT MESSAGE (OUTPATIENT)
Dept: PRIMARY CARE CLINIC | Facility: CLINIC | Age: 30
End: 2025-07-28
Payer: MEDICAID

## 2025-07-28 LAB — BACTERIA UR CULT: ABNORMAL
